# Patient Record
Sex: FEMALE | Race: WHITE | Employment: OTHER | URBAN - METROPOLITAN AREA
[De-identification: names, ages, dates, MRNs, and addresses within clinical notes are randomized per-mention and may not be internally consistent; named-entity substitution may affect disease eponyms.]

---

## 2022-08-01 ENCOUNTER — APPOINTMENT (OUTPATIENT)
Dept: CT IMAGING | Age: 88
End: 2022-08-01
Payer: MEDICARE

## 2022-08-01 ENCOUNTER — HOSPITAL ENCOUNTER (OUTPATIENT)
Age: 88
Setting detail: OBSERVATION
Discharge: HOME | End: 2022-08-03
Attending: STUDENT IN AN ORGANIZED HEALTH CARE EDUCATION/TRAINING PROGRAM | Admitting: INTERNAL MEDICINE
Payer: MEDICARE

## 2022-08-01 ENCOUNTER — APPOINTMENT (OUTPATIENT)
Dept: GENERAL RADIOLOGY | Age: 88
End: 2022-08-01
Payer: MEDICARE

## 2022-08-01 DIAGNOSIS — R53.1 GENERALIZED WEAKNESS: ICD-10-CM

## 2022-08-01 DIAGNOSIS — N17.9 AKI (ACUTE KIDNEY INJURY) (HCC): Primary | ICD-10-CM

## 2022-08-01 PROBLEM — N39.0 UTI (URINARY TRACT INFECTION) WITH PYURIA: Status: ACTIVE | Noted: 2022-08-01

## 2022-08-01 LAB
ALBUMIN SERPL-MCNC: 3.8 G/DL (ref 3.5–5.2)
ALBUMIN SERPL-MCNC: 4 G/DL (ref 3.5–5.2)
ALP BLD-CCNC: 80 U/L (ref 35–104)
ALP BLD-CCNC: 84 U/L (ref 35–104)
ALT SERPL-CCNC: <5 U/L (ref 0–32)
ALT SERPL-CCNC: <5 U/L (ref 0–32)
ANION GAP SERPL CALCULATED.3IONS-SCNC: 13 MMOL/L (ref 7–16)
ANION GAP SERPL CALCULATED.3IONS-SCNC: 14 MMOL/L (ref 7–16)
AST SERPL-CCNC: 15 U/L (ref 0–31)
AST SERPL-CCNC: 15 U/L (ref 0–31)
BACTERIA: ABNORMAL /HPF
BILIRUB SERPL-MCNC: 0.8 MG/DL (ref 0–1.2)
BILIRUB SERPL-MCNC: 0.8 MG/DL (ref 0–1.2)
BILIRUBIN URINE: NEGATIVE
BLOOD, URINE: NEGATIVE
BUN BLDV-MCNC: 20 MG/DL (ref 6–23)
BUN BLDV-MCNC: 21 MG/DL (ref 6–23)
CALCIUM SERPL-MCNC: 9.1 MG/DL (ref 8.6–10.2)
CALCIUM SERPL-MCNC: 9.4 MG/DL (ref 8.6–10.2)
CHLORIDE BLD-SCNC: 107 MMOL/L (ref 98–107)
CHLORIDE BLD-SCNC: 107 MMOL/L (ref 98–107)
CLARITY: CLEAR
CO2: 20 MMOL/L (ref 22–29)
CO2: 20 MMOL/L (ref 22–29)
COLOR: YELLOW
CREAT SERPL-MCNC: 1.1 MG/DL (ref 0.5–1)
CREAT SERPL-MCNC: 1.2 MG/DL (ref 0.5–1)
GFR AFRICAN AMERICAN: 50
GFR AFRICAN AMERICAN: 56
GFR NON-AFRICAN AMERICAN: 42 ML/MIN/1.73
GFR NON-AFRICAN AMERICAN: 46 ML/MIN/1.73
GLUCOSE BLD-MCNC: 91 MG/DL (ref 74–99)
GLUCOSE BLD-MCNC: 94 MG/DL (ref 74–99)
GLUCOSE URINE: NEGATIVE MG/DL
HCT VFR BLD CALC: 43.4 % (ref 34–48)
HEMOGLOBIN: 14.2 G/DL (ref 11.5–15.5)
KETONES, URINE: NEGATIVE MG/DL
LACTIC ACID: 1.1 MMOL/L (ref 0.5–2.2)
LEUKOCYTE ESTERASE, URINE: ABNORMAL
MCH RBC QN AUTO: 29.2 PG (ref 26–35)
MCHC RBC AUTO-ENTMCNC: 32.7 % (ref 32–34.5)
MCV RBC AUTO: 89.1 FL (ref 80–99.9)
NITRITE, URINE: NEGATIVE
PDW BLD-RTO: 13.3 FL (ref 11.5–15)
PH UA: 6 (ref 5–9)
PLATELET # BLD: 288 E9/L (ref 130–450)
PMV BLD AUTO: 9.3 FL (ref 7–12)
POTASSIUM REFLEX MAGNESIUM: 4.1 MMOL/L (ref 3.5–5)
POTASSIUM REFLEX MAGNESIUM: 4.3 MMOL/L (ref 3.5–5)
PRO-BNP: 943 PG/ML (ref 0–450)
PROTEIN UA: NEGATIVE MG/DL
RBC # BLD: 4.87 E12/L (ref 3.5–5.5)
RBC UA: ABNORMAL /HPF (ref 0–2)
SODIUM BLD-SCNC: 140 MMOL/L (ref 132–146)
SODIUM BLD-SCNC: 141 MMOL/L (ref 132–146)
SPECIFIC GRAVITY UA: <=1.005 (ref 1–1.03)
TOTAL PROTEIN: 6.5 G/DL (ref 6.4–8.3)
TOTAL PROTEIN: 6.8 G/DL (ref 6.4–8.3)
TROPONIN, HIGH SENSITIVITY: 14 NG/L (ref 0–9)
TROPONIN, HIGH SENSITIVITY: 15 NG/L (ref 0–9)
UROBILINOGEN, URINE: 0.2 E.U./DL
WBC # BLD: 9.1 E9/L (ref 4.5–11.5)
WBC UA: ABNORMAL /HPF (ref 0–5)

## 2022-08-01 PROCEDURE — 83605 ASSAY OF LACTIC ACID: CPT

## 2022-08-01 PROCEDURE — 2580000003 HC RX 258: Performed by: PHYSICIAN ASSISTANT

## 2022-08-01 PROCEDURE — 87449 NOS EACH ORGANISM AG IA: CPT

## 2022-08-01 PROCEDURE — 6360000004 HC RX CONTRAST MEDICATION: Performed by: RADIOLOGY

## 2022-08-01 PROCEDURE — 0202U NFCT DS 22 TRGT SARS-COV-2: CPT

## 2022-08-01 PROCEDURE — G0378 HOSPITAL OBSERVATION PER HR: HCPCS

## 2022-08-01 PROCEDURE — 80053 COMPREHEN METABOLIC PANEL: CPT

## 2022-08-01 PROCEDURE — 36415 COLL VENOUS BLD VENIPUNCTURE: CPT

## 2022-08-01 PROCEDURE — 81001 URINALYSIS AUTO W/SCOPE: CPT

## 2022-08-01 PROCEDURE — 87088 URINE BACTERIA CULTURE: CPT

## 2022-08-01 PROCEDURE — 93005 ELECTROCARDIOGRAM TRACING: CPT | Performed by: PHYSICIAN ASSISTANT

## 2022-08-01 PROCEDURE — 84484 ASSAY OF TROPONIN QUANT: CPT

## 2022-08-01 PROCEDURE — 2580000003 HC RX 258: Performed by: INTERNAL MEDICINE

## 2022-08-01 PROCEDURE — 96361 HYDRATE IV INFUSION ADD-ON: CPT

## 2022-08-01 PROCEDURE — 71046 X-RAY EXAM CHEST 2 VIEWS: CPT

## 2022-08-01 PROCEDURE — 87040 BLOOD CULTURE FOR BACTERIA: CPT

## 2022-08-01 PROCEDURE — 83880 ASSAY OF NATRIURETIC PEPTIDE: CPT

## 2022-08-01 PROCEDURE — 71275 CT ANGIOGRAPHY CHEST: CPT

## 2022-08-01 PROCEDURE — 96374 THER/PROPH/DIAG INJ IV PUSH: CPT

## 2022-08-01 PROCEDURE — 2580000003 HC RX 258: Performed by: RADIOLOGY

## 2022-08-01 PROCEDURE — 96372 THER/PROPH/DIAG INJ SC/IM: CPT

## 2022-08-01 PROCEDURE — 85027 COMPLETE CBC AUTOMATED: CPT

## 2022-08-01 PROCEDURE — 6360000002 HC RX W HCPCS: Performed by: INTERNAL MEDICINE

## 2022-08-01 PROCEDURE — 99285 EMERGENCY DEPT VISIT HI MDM: CPT

## 2022-08-01 RX ORDER — MV-MN/OM3/DHA/EPA/FISH/LUT/ZEA 250-5-1 MG
1 CAPSULE ORAL DAILY
COMMUNITY

## 2022-08-01 RX ORDER — LEVOTHYROXINE SODIUM 0.05 MG/1
50 TABLET ORAL DAILY
COMMUNITY

## 2022-08-01 RX ORDER — PANTOPRAZOLE SODIUM 40 MG/1
40 TABLET, DELAYED RELEASE ORAL
Status: DISCONTINUED | OUTPATIENT
Start: 2022-08-02 | End: 2022-08-03 | Stop reason: HOSPADM

## 2022-08-01 RX ORDER — FUROSEMIDE 20 MG/1
20 TABLET ORAL
COMMUNITY

## 2022-08-01 RX ORDER — POTASSIUM CHLORIDE 750 MG/1
10 CAPSULE, EXTENDED RELEASE ORAL
COMMUNITY

## 2022-08-01 RX ORDER — SODIUM CHLORIDE 0.9 % (FLUSH) 0.9 %
10 SYRINGE (ML) INJECTION PRN
Status: COMPLETED | OUTPATIENT
Start: 2022-08-01 | End: 2022-08-01

## 2022-08-01 RX ORDER — AMIODARONE HYDROCHLORIDE 100 MG/1
100 TABLET ORAL DAILY
COMMUNITY

## 2022-08-01 RX ORDER — CHOLECALCIFEROL (VITAMIN D3) 125 MCG
500 CAPSULE ORAL DAILY
COMMUNITY

## 2022-08-01 RX ORDER — SODIUM CHLORIDE 9 MG/ML
INJECTION, SOLUTION INTRAVENOUS CONTINUOUS
Status: DISCONTINUED | OUTPATIENT
Start: 2022-08-01 | End: 2022-08-02

## 2022-08-01 RX ORDER — 0.9 % SODIUM CHLORIDE 0.9 %
500 INTRAVENOUS SOLUTION INTRAVENOUS ONCE
Status: COMPLETED | OUTPATIENT
Start: 2022-08-01 | End: 2022-08-01

## 2022-08-01 RX ORDER — ACETAMINOPHEN 325 MG/1
650 TABLET ORAL EVERY 4 HOURS PRN
Status: DISCONTINUED | OUTPATIENT
Start: 2022-08-01 | End: 2022-08-03 | Stop reason: HOSPADM

## 2022-08-01 RX ORDER — HEPARIN SODIUM 10000 [USP'U]/ML
5000 INJECTION, SOLUTION INTRAVENOUS; SUBCUTANEOUS EVERY 8 HOURS
Status: DISCONTINUED | OUTPATIENT
Start: 2022-08-01 | End: 2022-08-02

## 2022-08-01 RX ADMIN — SODIUM CHLORIDE, PRESERVATIVE FREE 10 ML: 5 INJECTION INTRAVENOUS at 18:35

## 2022-08-01 RX ADMIN — HEPARIN SODIUM 5000 UNITS: 10000 INJECTION INTRAVENOUS; SUBCUTANEOUS at 21:04

## 2022-08-01 RX ADMIN — SODIUM CHLORIDE: 9 INJECTION, SOLUTION INTRAVENOUS at 21:03

## 2022-08-01 RX ADMIN — SODIUM CHLORIDE 500 ML: 9 INJECTION, SOLUTION INTRAVENOUS at 15:35

## 2022-08-01 RX ADMIN — IOPAMIDOL 60 ML: 755 INJECTION, SOLUTION INTRAVENOUS at 18:38

## 2022-08-01 RX ADMIN — WATER 1000 MG: 1 INJECTION INTRAMUSCULAR; INTRAVENOUS; SUBCUTANEOUS at 21:03

## 2022-08-01 ASSESSMENT — ENCOUNTER SYMPTOMS
CHOKING: 0
SHORTNESS OF BREATH: 1
VOMITING: 0
COUGH: 1
PHOTOPHOBIA: 0
EYE PAIN: 0
ABDOMINAL PAIN: 0
NAUSEA: 1

## 2022-08-01 ASSESSMENT — PAIN SCALES - GENERAL
PAINLEVEL_OUTOF10: 0
PAINLEVEL_OUTOF10: 0

## 2022-08-01 ASSESSMENT — LIFESTYLE VARIABLES: HOW OFTEN DO YOU HAVE A DRINK CONTAINING ALCOHOL: NEVER

## 2022-08-01 NOTE — ED PROVIDER NOTES
nausea. Negative for abdominal pain and vomiting. Genitourinary:  Negative for dysuria, enuresis and hematuria. Musculoskeletal:  Negative for arthralgias and myalgias. Skin:  Negative for rash and wound. Neurological:  Positive for weakness and light-headedness. Physical Exam  Constitutional:       Appearance: She is normal weight. Eyes:      Extraocular Movements: Extraocular movements intact. Pupils: Pupils are equal, round, and reactive to light. Cardiovascular:      Rate and Rhythm: Normal rate and regular rhythm. Pulmonary:      Effort: Pulmonary effort is normal.      Breath sounds: Normal breath sounds. No decreased breath sounds, wheezing, rhonchi or rales. Chest:      Chest wall: No tenderness or crepitus. Abdominal:      Palpations: Abdomen is soft. Tenderness: There is no abdominal tenderness. Musculoskeletal:         General: Normal range of motion. Right lower leg: No edema. Left lower leg: No edema. Skin:     General: Skin is warm. Capillary Refill: Capillary refill takes less than 2 seconds. Coloration: Skin is not cyanotic. Neurological:      General: No focal deficit present. Mental Status: She is alert and oriented to person, place, and time. Procedures       MDM  Number of Diagnoses or Management Options  MAZIN (acute kidney injury) ([facility] [address]  Generalized weakness  Diagnosis management comments: Patient presents with her daughter to the ED with complaints of symptoms as mentioned in HPI. Patient is calm, alert, oriented. Management plan is agreed upon with patient and CTA pulmonary with IV contrast is ordered. Other labs are also ordered. ED Course as of 08/02/22 0759   Mon Aug 01, 2022   1528 Patient pulse ox is 95%. [AH]   1726 BNP returned over 900. Troponin elevated at 15 therefore repeat troponin ordered. [AH]   80 Consulted with Dr. Amrita Jeter, hospitalist, who accepted for admission.   [KG]   1835 Repeat troponin resulted less than initial troponin at 14. [AH]      ED Course User Index  [AH] Domo Bradshaw MD  [KG] Kathy Wylie DO     ED Course as of 08/16/22 2151   [facility] Aug 01, 2022   1528 Patient pulse ox is 95%. [AH]   1726 BNP returned over 900. Troponin elevated at 15 therefore repeat troponin ordered. [AH]   80 Consulted with Dr. Jesus Terrazas, hospitalist, who accepted for admission. [KG]   1835 Repeat troponin resulted less than initial troponin at 14. [AH]      ED Course User Index  [AH] Domo Bradshaw MD  [KG] Kathy Wylie DO       --------------------------------------------- PAST HISTORY ---------------------------------------------  Past Medical History:  has a past medical history of Aortic stenosis, CKD (chronic kidney disease) stage 3, GFR 30-59 ml/min ([address] COVID-19, Pulmonary hypertension ([address] and UTI (urinary tract infection) with pyuria. Past Surgical History:  has no past surgical history on file. Social History:  reports that she has never smoked. She has never been exposed to tobacco smoke. She has never used smokeless tobacco. She reports that she does not drink alcohol and does not use drugs. Family History: family history is not on file. The patients home medications have been reviewed. Allergies: Patient has no known allergies.     -------------------------------------------------- RESULTS -------------------------------------------------    LABS:  Results for orders placed or performed during the hospital encounter of 08/01/22   Culture, Urine    Specimen: Urine, clean catch   Result Value Ref Range    Urine Culture, Routine <10,000 CFU/mL  Mixed gram positive organisms      Culture, Blood 1    Specimen: Blood   Result Value Ref Range    Blood Culture, Routine 5 Days no growth    Culture, Blood 2    Specimen: Blood   Result Value Ref Range    Culture, Blood 2 5 Days no growth    Respiratory Panel, Molecular, with COVID-19 (Restricted: peds pts or suitable admitted adults)    Specimen: Nasopharyngeal   Result Value Ref Range    Adenovirus by PCR Not Detected Not Detected    Bordetella parapertussis by PCR Not Detected Not Detected    Bordetella pertussis by PCR Not Detected Not Detected    Chlamydophilia pneumoniae by PCR Not Detected Not Detected    Coronavirus 229E by PCR Not Detected Not Detected    Coronavirus HKU1 by PCR Not Detected Not Detected    Coronavirus NL63 by PCR Not Detected Not Detected    Coronavirus OC43 by PCR Not Detected Not Detected    SARS-CoV-2, PCR DETECTED (A) Not Detected    Human Metapneumovirus by PCR Not Detected Not Detected    Human Rhinovirus/Enterovirus by PCR Not Detected Not Detected    Influenza A by PCR Not Detected Not Detected    Influenza B by PCR Not Detected Not Detected    Mycoplasma pneumoniae by PCR Not Detected Not Detected    Parainfluenza Virus 1 by PCR Not Detected Not Detected    Parainfluenza Virus 2 by PCR Not Detected Not Detected    Parainfluenza Virus 3 by PCR Not Detected Not Detected    Parainfluenza Virus 4 by PCR Not Detected Not Detected    Respiratory Syncytial Virus by PCR Not Detected Not Detected   Strep Pneumoniae Antigen    Specimen: Urine, clean catch   Result Value Ref Range    STREP PNEUMONIAE ANTIGEN, URINE       Presumptive negative- suggests no current or recent  pneumococcal infection. Infection due to Strep pneumoniae cannot be  ruled out since the antigen present in the sample  may be below the detection limit of the test.  Normal Range:Presumptive Negative     Legionella antigen, urine    Specimen: Urine   Result Value Ref Range    L. pneumophila Serogp 1 Ur Ag       Presumptive Negative -suggesting no recent or current infections  with Legionella pneumophila serogroup 1. Infection to Legionella cannot be ruled out since other serogroups  and species may cause infection, antigen may not be present in  early infection, or level of antigen may be below the  detection limit.   Normal Range: Presumptive Negative     CBC   Result Value Ref Range    WBC 9.1 4.5 - 11.5 E9/L    RBC 4.87 3.50 - 5.50 E12/L    Hemoglobin 14.2 11.5 - 15.5 g/dL    Hematocrit 43.4 34.0 - 48.0 %    MCV 89.1 80.0 - 99.9 fL    MCH 29.2 26.0 - 35.0 pg    MCHC 32.7 32.0 - 34.5 %    RDW 13.3 11.5 - 15.0 fL    Platelets 583 463 - 147 E9/L    MPV 9.3 7.0 - 12.0 fL   Troponin   Result Value Ref Range    Troponin, High Sensitivity 15 (H) 0 - 9 ng/L   Urinalysis   Result Value Ref Range    Color, UA Yellow Straw/Yellow    Clarity, UA Clear Clear    Glucose, Ur Negative Negative mg/dL    Bilirubin Urine Negative Negative    Ketones, Urine Negative Negative mg/dL    Specific Gravity, UA <=1.005 1.005 - 1.030    Blood, Urine Negative Negative    pH, UA 6.0 5.0 - 9.0    Protein, UA Negative Negative mg/dL    Urobilinogen, Urine 0.2 <2.0 E.U./dL    Nitrite, Urine Negative Negative    Leukocyte Esterase, Urine MODERATE (A) Negative   Brain Natriuretic Peptide   Result Value Ref Range    Pro- (H) 0 - 450 pg/mL   Lactic Acid   Result Value Ref Range    Lactic Acid 1.1 0.5 - 2.2 mmol/L   Microscopic Urinalysis   Result Value Ref Range    WBC, UA 2-5 0 - 5 /HPF    RBC, UA NONE 0 - 2 /HPF    Bacteria, UA RARE (A) None Seen /HPF   Troponin   Result Value Ref Range    Troponin, High Sensitivity 14 (H) 0 - 9 ng/L   Comprehensive Metabolic Panel w/ Reflex to MG   Result Value Ref Range    Sodium 140 132 - 146 mmol/L    Potassium reflex Magnesium 4.3 3.5 - 5.0 mmol/L    Chloride 107 98 - 107 mmol/L    CO2 20 (L) 22 - 29 mmol/L    Anion Gap 13 7 - 16 mmol/L    Glucose 91 74 - 99 mg/dL    BUN 20 6 - 23 mg/dL    Creatinine 1.1 (H) 0.5 - 1.0 mg/dL    GFR Non-African American 46 >=60 mL/min/1.73    GFR African American 56     Calcium 9.1 8.6 - 10.2 mg/dL    Total Protein 6.5 6.4 - 8.3 g/dL    Albumin 3.8 3.5 - 5.2 g/dL    Total Bilirubin 0.8 0.0 - 1.2 mg/dL    Alkaline Phosphatase 80 35 - 104 U/L    ALT <5 0 - 32 U/L    AST 15 0 - 31 U/L   Comprehensive Metabolic Panel w/ Reflex to MG   Result Value Ref Range    Sodium 141 132 - 146 mmol/L    Potassium reflex Magnesium 4.1 3.5 - 5.0 mmol/L    Chloride 107 98 - 107 mmol/L    CO2 20 (L) 22 - 29 mmol/L    Anion Gap 14 7 - 16 mmol/L    Glucose 94 74 - 99 mg/dL    BUN 21 6 - 23 mg/dL    Creatinine 1.2 (H) 0.5 - 1.0 mg/dL    GFR Non-African American 42 >=60 mL/min/1.73    GFR African American 50     Calcium 9.4 8.6 - 10.2 mg/dL    Total Protein 6.8 6.4 - 8.3 g/dL    Albumin 4.0 3.5 - 5.2 g/dL    Total Bilirubin 0.8 0.0 - 1.2 mg/dL    Alkaline Phosphatase 84 35 - 104 U/L    ALT <5 0 - 32 U/L    AST 15 0 - 31 U/L   Basic Metabolic Panel   Result Value Ref Range    Sodium 141 132 - 146 mmol/L    Potassium 3.9 3.5 - 5.0 mmol/L    Chloride 109 (H) 98 - 107 mmol/L    CO2 21 (L) 22 - 29 mmol/L    Anion Gap 11 7 - 16 mmol/L    Glucose 84 74 - 99 mg/dL    BUN 16 6 - 23 mg/dL    Creatinine 1.1 (H) 0.5 - 1.0 mg/dL    GFR Non-African American 46 >=60 mL/min/1.73    GFR African American 56     Calcium 8.8 8.6 - 10.2 mg/dL   Brain Natriuretic Peptide   Result Value Ref Range    Pro-BNP 1,363 (H) 0 - 450 pg/mL   Calcium, Ionized   Result Value Ref Range    Calcium, Ionized 1.26 1.15 - 1.33 mmol/L   Antistreptolysin O Titer   Result Value Ref Range    ASO <20 0 - 200 IU/mL   CBC with Auto Differential   Result Value Ref Range    WBC 7.9 4.5 - 11.5 E9/L    RBC 4.32 3.50 - 5.50 E12/L    Hemoglobin 12.8 11.5 - 15.5 g/dL    Hematocrit 38.5 34.0 - 48.0 %    MCV 89.1 80.0 - 99.9 fL    MCH 29.6 26.0 - 35.0 pg    MCHC 33.2 32.0 - 34.5 %    RDW 13.2 11.5 - 15.0 fL    Platelets 421 301 - 067 E9/L    MPV 9.1 7.0 - 12.0 fL    Neutrophils % 51.1 43.0 - 80.0 %    Immature Granulocytes % 0.5 0.0 - 5.0 %    Lymphocytes % 32.6 20.0 - 42.0 %    Monocytes % 10.6 2.0 - 12.0 %    Eosinophils % 4.1 0.0 - 6.0 %    Basophils % 1.1 0.0 - 2.0 %    Neutrophils Absolute 4.03 1.80 - 7.30 E9/L    Immature Granulocytes # 0.04 E9/L    Lymphocytes Absolute 2.57 1.50 - 4.00 E9/L    Monocytes Absolute 0.84 0.10 - 0.95 E9/L    Eosinophils Absolute 0.32 0.05 - 0.50 E9/L    Basophils Absolute 0.09 0.00 - 0.20 E9/L   C-Reactive Protein   Result Value Ref Range    CRP 0.6 (H) 0.0 - 0.4 mg/dL   Magnesium   Result Value Ref Range    Magnesium 2.2 1.6 - 2.6 mg/dL   Lipid Panel   Result Value Ref Range    Cholesterol, Total 177 0 - 199 mg/dL    Triglycerides 67 0 - 149 mg/dL    HDL 53 >40 mg/dL    LDL Calculated 111 (H) 0 - 99 mg/dL    VLDL Cholesterol Calculated 13 mg/dL   Lactate, Sepsis   Result Value Ref Range    Lactic Acid, Sepsis 0.8 0.5 - 1.9 mmol/L   Hepatic Function Panel   Result Value Ref Range    Total Protein 5.7 (L) 6.4 - 8.3 g/dL    Albumin 3.3 (L) 3.5 - 5.2 g/dL    Alkaline Phosphatase 69 35 - 104 U/L    ALT <5 0 - 32 U/L    AST 15 0 - 31 U/L    Total Bilirubin 0.6 0.0 - 1.2 mg/dL    Bilirubin, Direct <0.2 0.0 - 0.3 mg/dL    Bilirubin, Indirect see below 0.0 - 1.0 mg/dL   Hemoglobin A1C   Result Value Ref Range    Hemoglobin A1C 5.6 4.0 - 5.6 %   Ferritin   Result Value Ref Range    Ferritin 152 ng/mL   Iron and TIBC   Result Value Ref Range    Iron 63 37 - 145 mcg/dL    TIBC 197 (L) 250 - 450 mcg/dL    Iron Saturation 32 15 - 50 %   Vitamin B12 & Folate   Result Value Ref Range    Vitamin B-12 >2000 (H) 211 - 946 pg/mL    Folate >20.0 4.8 - 24.2 ng/mL   Phosphorus   Result Value Ref Range    Phosphorus 3.1 2.5 - 4.5 mg/dL   Prealbumin   Result Value Ref Range    Prealbumin 20 20 - 40 mg/dL   Procalcitonin   Result Value Ref Range    Procalcitonin 0.05 0.00 - 0.08 ng/mL   TSH   Result Value Ref Range    TSH 1.350 0.270 - 4.200 uIU/mL   Troponin   Result Value Ref Range    Troponin, High Sensitivity 16 (H) 0 - 9 ng/L   Sedimentation Rate   Result Value Ref Range    Sed Rate 12 0 - 20 mm/Hr   Uric Acid   Result Value Ref Range    Uric Acid, Serum 4.5 2.4 - 5.7 mg/dL   D-Dimer, Quantitative   Result Value Ref Range    D-Dimer, Quant <200 ng/mL DDU   Troponin   Result Value Ref Range    Troponin, High Sensitivity 17 (H) 0 - 9 ng/L   CK   Result Value Ref Range    Total CK 40 20 - 180 U/L   Basic Metabolic Panel   Result Value Ref Range    Sodium 143 132 - 146 mmol/L    Potassium 4.0 3.5 - 5.0 mmol/L    Chloride 111 (H) 98 - 107 mmol/L    CO2 20 (L) 22 - 29 mmol/L    Anion Gap 12 7 - 16 mmol/L    Glucose 98 74 - 99 mg/dL    BUN 21 6 - 23 mg/dL    Creatinine 1.2 (H) 0.5 - 1.0 mg/dL    GFR Non-African American 42 >=60 mL/min/1.73    GFR African American 50     Calcium 8.8 8.6 - 10.2 mg/dL   CBC with Auto Differential   Result Value Ref Range    WBC 7.4 4.5 - 11.5 E9/L    RBC 4.41 3.50 - 5.50 E12/L    Hemoglobin 13.0 11.5 - 15.5 g/dL    Hematocrit 39.5 34.0 - 48.0 %    MCV 89.6 80.0 - 99.9 fL    MCH 29.5 26.0 - 35.0 pg    MCHC 32.9 32.0 - 34.5 %    RDW 13.3 11.5 - 15.0 fL    Platelets 875 839 - 490 E9/L    MPV 9.3 7.0 - 12.0 fL    Neutrophils % 52.1 43.0 - 80.0 %    Immature Granulocytes % 0.3 0.0 - 5.0 %    Lymphocytes % 31.6 20.0 - 42.0 %    Monocytes % 10.3 2.0 - 12.0 %    Eosinophils % 4.6 0.0 - 6.0 %    Basophils % 1.1 0.0 - 2.0 %    Neutrophils Absolute 3.85 1.80 - 7.30 E9/L    Immature Granulocytes # 0.02 E9/L    Lymphocytes Absolute 2.33 1.50 - 4.00 E9/L    Monocytes Absolute 0.76 0.10 - 0.95 E9/L    Eosinophils Absolute 0.34 0.05 - 0.50 E9/L    Basophils Absolute 0.08 0.00 - 0.20 E9/L   Magnesium   Result Value Ref Range    Magnesium 2.1 1.6 - 2.6 mg/dL   Lactate, Sepsis   Result Value Ref Range    Lactic Acid, Sepsis 1.6 0.5 - 1.9 mmol/L   Hepatic Function Panel   Result Value Ref Range    Total Protein 5.7 (L) 6.4 - 8.3 g/dL    Albumin 3.3 (L) 3.5 - 5.2 g/dL    Alkaline Phosphatase 72 35 - 104 U/L    ALT 5 0 - 32 U/L    AST 14 0 - 31 U/L    Total Bilirubin 0.6 0.0 - 1.2 mg/dL    Bilirubin, Direct <0.2 0.0 - 0.3 mg/dL    Bilirubin, Indirect see below 0.0 - 1.0 mg/dL   Phosphorus   Result Value Ref Range    Phosphorus 3.4 2.5 - 4.5 mg/dL   Sedimentation Rate   Result Value Ref Range Sed Rate 5 0 - 20 mm/Hr   Vitamin D 25 Hydroxy   Result Value Ref Range    Vit D, 25-Hydroxy 47 30 - 100 ng/mL   EKG 12 Lead   Result Value Ref Range    Ventricular Rate 59 BPM    Atrial Rate 59 BPM    P-R Interval 338 ms    QRS Duration 88 ms    Q-T Interval 476 ms    QTc Calculation (Bazett) 471 ms    P Axis 80 degrees    R Axis -25 degrees    T Axis 17 degrees       RADIOLOGY:  CTA PULMONARY W CONTRAST   Final Result   No evidence of pulmonary embolism or acute pulmonary abnormality. Cardiomegaly with dilated main pulmonary artery suggestive of pulmonary   arterial hypertension. XR CHEST (2 VW)   Final Result   No radiographic evidence of acute cardiopulmonary disease. EKG:  This EKG is signed and interpreted by me. Rate: 59  Rhythm: Sinus  Interpretation: Sinus bradycardia with 1st degree AV block, nonspecific st changes throughout, qtc is 471  Comparison: stable as compared to patient's most recent EKG      ------------------------- NURSING NOTES AND VITALS REVIEWED ---------------------------  Date / Time Roomed:  8/1/2022  2:31 PM  ED Bed Assignment:  [id number]    The nursing notes within the ED encounter and vital signs as below have been reviewed. No data found. Oxygen Saturation Interpretation: Normal    ------------------------------------------ PROGRESS NOTES ------------------------------------------  Re-evaluation(s):  Patients symptoms show no change  Repeat physical examination is not changed    Counseling:  I have spoken with the patient and discussed todays results, in addition to providing specific details for the plan of care and counseling regarding the diagnosis and prognosis. Their questions are answered at this time and they are agreeable with the plan of admission.    --------------------------------- ADDITIONAL PROVIDER NOTES ---------------------------------  Consultations:  Spoke with Dr. Roxana Baron Discussed case.   They will admit the patient. This patient's ED course included: a personal history and physicial examination, re-evaluation prior to disposition, multiple bedside re-evaluations, IV medications, cardiac monitoring, continuous pulse oximetry, and complex medical decision making and emergency management    This patient has remained hemodynamically stable during their ED course. Diagnosis:  1. MAZIN (acute kidney injury) ([facility] [address]    2. Generalized weakness        Disposition:  Patient's disposition: Admit to med/surg floor  Patient's condition is stable.          Aurelio Prabhakar DO  08/16/22 2156

## 2022-08-01 NOTE — PROGRESS NOTES
Database initiated pharmacy and medications verified with the patient. She is A&O independent from home alone. She is hard of hearing even with her hearing aids.  She ambulates without assistive devices and is RA at baseline

## 2022-08-01 NOTE — ED NOTES
Department of Emergency Medicine  FIRST PROVIDER TRIAGE NOTE             Independent MLP           8/1/22  12:25 PM EDT    Date of Encounter: 8/1/22   MRN: [id number]      HPI: Marlon Robert is a 81 y.o. female who presents to the ED for Shortness of Breath and Fatigue (covid positive x a few weeks ago, weakness, covid antibodies 7/21 ( sent by Dr Blayne Coello))  Antibody infusion. Primary urologist concern for dehydration. Patient sent in for further evaluation. ROS: Negative for cp, back pain, fever, or cough. PE: Gen Appearance/Constitutional: alert  HEENT: NC/NT. PERRLA,  Airway patent. Neck: supple         Initial Plan of Care: All treatment areas with department are currently occupied. Plan to order/Initiate the following while awaiting opening in ED: labs, EKG, and imaging studies.   Initiate Treatment-Testing, Proceed toTreatment Area When Bed Available for ED Attending/MLP to Continue Care    Electronically signed by DEMETRIUS Squires   DD: 8/1/22       DEMETRIUS Mireles  08/01/22 1080

## 2022-08-02 PROBLEM — U07.1 COVID-19: Status: ACTIVE | Noted: 2022-08-02

## 2022-08-02 PROBLEM — I35.0 AORTIC STENOSIS: Status: ACTIVE | Noted: 2022-08-02

## 2022-08-02 PROBLEM — N18.30 CKD (CHRONIC KIDNEY DISEASE) STAGE 3, GFR 30-59 ML/MIN (HCC): Status: ACTIVE | Noted: 2022-08-02

## 2022-08-02 PROBLEM — I27.20 PULMONARY HYPERTENSION (HCC): Status: ACTIVE | Noted: 2022-08-02

## 2022-08-02 LAB
ADENOVIRUS BY PCR: NOT DETECTED
ALBUMIN SERPL-MCNC: 3.3 G/DL (ref 3.5–5.2)
ALP BLD-CCNC: 69 U/L (ref 35–104)
ALT SERPL-CCNC: <5 U/L (ref 0–32)
ANION GAP SERPL CALCULATED.3IONS-SCNC: 11 MMOL/L (ref 7–16)
ANTISTREPTOLYSIN-O: <20 IU/ML (ref 0–200)
AST SERPL-CCNC: 15 U/L (ref 0–31)
BASOPHILS ABSOLUTE: 0.09 E9/L (ref 0–0.2)
BASOPHILS RELATIVE PERCENT: 1.1 % (ref 0–2)
BILIRUB SERPL-MCNC: 0.6 MG/DL (ref 0–1.2)
BILIRUBIN DIRECT: <0.2 MG/DL (ref 0–0.3)
BILIRUBIN, INDIRECT: ABNORMAL MG/DL (ref 0–1)
BORDETELLA PARAPERTUSSIS BY PCR: NOT DETECTED
BORDETELLA PERTUSSIS BY PCR: NOT DETECTED
BUN BLDV-MCNC: 16 MG/DL (ref 6–23)
C-REACTIVE PROTEIN: 0.6 MG/DL (ref 0–0.4)
CALCIUM IONIZED: 1.26 MMOL/L (ref 1.15–1.33)
CALCIUM SERPL-MCNC: 8.8 MG/DL (ref 8.6–10.2)
CHLAMYDOPHILIA PNEUMONIAE BY PCR: NOT DETECTED
CHLORIDE BLD-SCNC: 109 MMOL/L (ref 98–107)
CHOLESTEROL, TOTAL: 177 MG/DL (ref 0–199)
CO2: 21 MMOL/L (ref 22–29)
CORONAVIRUS 229E BY PCR: NOT DETECTED
CORONAVIRUS HKU1 BY PCR: NOT DETECTED
CORONAVIRUS NL63 BY PCR: NOT DETECTED
CORONAVIRUS OC43 BY PCR: NOT DETECTED
CREAT SERPL-MCNC: 1.1 MG/DL (ref 0.5–1)
D DIMER: <200 NG/ML DDU
EKG ATRIAL RATE: 59 BPM
EKG P AXIS: 80 DEGREES
EKG P-R INTERVAL: 338 MS
EKG Q-T INTERVAL: 476 MS
EKG QRS DURATION: 88 MS
EKG QTC CALCULATION (BAZETT): 471 MS
EKG R AXIS: -25 DEGREES
EKG T AXIS: 17 DEGREES
EKG VENTRICULAR RATE: 59 BPM
EOSINOPHILS ABSOLUTE: 0.32 E9/L (ref 0.05–0.5)
EOSINOPHILS RELATIVE PERCENT: 4.1 % (ref 0–6)
FERRITIN: 152 NG/ML
FOLATE: >20 NG/ML (ref 4.8–24.2)
GFR AFRICAN AMERICAN: 56
GFR NON-AFRICAN AMERICAN: 46 ML/MIN/1.73
GLUCOSE BLD-MCNC: 84 MG/DL (ref 74–99)
HBA1C MFR BLD: 5.6 % (ref 4–5.6)
HCT VFR BLD CALC: 38.5 % (ref 34–48)
HDLC SERPL-MCNC: 53 MG/DL
HEMOGLOBIN: 12.8 G/DL (ref 11.5–15.5)
HUMAN METAPNEUMOVIRUS BY PCR: NOT DETECTED
HUMAN RHINOVIRUS/ENTEROVIRUS BY PCR: NOT DETECTED
IMMATURE GRANULOCYTES #: 0.04 E9/L
IMMATURE GRANULOCYTES %: 0.5 % (ref 0–5)
INFLUENZA A BY PCR: NOT DETECTED
INFLUENZA B BY PCR: NOT DETECTED
IRON SATURATION: 32 % (ref 15–50)
IRON: 63 MCG/DL (ref 37–145)
L. PNEUMOPHILA SEROGP 1 UR AG: NORMAL
LACTIC ACID, SEPSIS: 0.8 MMOL/L (ref 0.5–1.9)
LDL CHOLESTEROL CALCULATED: 111 MG/DL (ref 0–99)
LV EF: 67 %
LVEF MODALITY: NORMAL
LYMPHOCYTES ABSOLUTE: 2.57 E9/L (ref 1.5–4)
LYMPHOCYTES RELATIVE PERCENT: 32.6 % (ref 20–42)
MAGNESIUM: 2.2 MG/DL (ref 1.6–2.6)
MCH RBC QN AUTO: 29.6 PG (ref 26–35)
MCHC RBC AUTO-ENTMCNC: 33.2 % (ref 32–34.5)
MCV RBC AUTO: 89.1 FL (ref 80–99.9)
MONOCYTES ABSOLUTE: 0.84 E9/L (ref 0.1–0.95)
MONOCYTES RELATIVE PERCENT: 10.6 % (ref 2–12)
MYCOPLASMA PNEUMONIAE BY PCR: NOT DETECTED
NEUTROPHILS ABSOLUTE: 4.03 E9/L (ref 1.8–7.3)
NEUTROPHILS RELATIVE PERCENT: 51.1 % (ref 43–80)
PARAINFLUENZA VIRUS 1 BY PCR: NOT DETECTED
PARAINFLUENZA VIRUS 2 BY PCR: NOT DETECTED
PARAINFLUENZA VIRUS 3 BY PCR: NOT DETECTED
PARAINFLUENZA VIRUS 4 BY PCR: NOT DETECTED
PDW BLD-RTO: 13.2 FL (ref 11.5–15)
PHOSPHORUS: 3.1 MG/DL (ref 2.5–4.5)
PLATELET # BLD: 258 E9/L (ref 130–450)
PMV BLD AUTO: 9.1 FL (ref 7–12)
POTASSIUM SERPL-SCNC: 3.9 MMOL/L (ref 3.5–5)
PREALBUMIN: 20 MG/DL (ref 20–40)
PRO-BNP: 1363 PG/ML (ref 0–450)
PROCALCITONIN: 0.05 NG/ML (ref 0–0.08)
RBC # BLD: 4.32 E12/L (ref 3.5–5.5)
RESPIRATORY SYNCYTIAL VIRUS BY PCR: NOT DETECTED
SARS-COV-2, PCR: DETECTED
SEDIMENTATION RATE, ERYTHROCYTE: 12 MM/HR (ref 0–20)
SODIUM BLD-SCNC: 141 MMOL/L (ref 132–146)
STREP PNEUMONIAE ANTIGEN, URINE: NORMAL
TOTAL CK: 40 U/L (ref 20–180)
TOTAL IRON BINDING CAPACITY: 197 MCG/DL (ref 250–450)
TOTAL PROTEIN: 5.7 G/DL (ref 6.4–8.3)
TRIGL SERPL-MCNC: 67 MG/DL (ref 0–149)
TROPONIN, HIGH SENSITIVITY: 16 NG/L (ref 0–9)
TROPONIN, HIGH SENSITIVITY: 17 NG/L (ref 0–9)
TSH SERPL DL<=0.05 MIU/L-ACNC: 1.35 UIU/ML (ref 0.27–4.2)
URIC ACID, SERUM: 4.5 MG/DL (ref 2.4–5.7)
VITAMIN B-12: >2000 PG/ML (ref 211–946)
VLDLC SERPL CALC-MCNC: 13 MG/DL
WBC # BLD: 7.9 E9/L (ref 4.5–11.5)

## 2022-08-02 PROCEDURE — 83550 IRON BINDING TEST: CPT

## 2022-08-02 PROCEDURE — 93306 TTE W/DOPPLER COMPLETE: CPT

## 2022-08-02 PROCEDURE — 36415 COLL VENOUS BLD VENIPUNCTURE: CPT

## 2022-08-02 PROCEDURE — 83735 ASSAY OF MAGNESIUM: CPT

## 2022-08-02 PROCEDURE — 84550 ASSAY OF BLOOD/URIC ACID: CPT

## 2022-08-02 PROCEDURE — 2580000003 HC RX 258: Performed by: INTERNAL MEDICINE

## 2022-08-02 PROCEDURE — 83880 ASSAY OF NATRIURETIC PEPTIDE: CPT

## 2022-08-02 PROCEDURE — 84100 ASSAY OF PHOSPHORUS: CPT

## 2022-08-02 PROCEDURE — 6360000002 HC RX W HCPCS: Performed by: INTERNAL MEDICINE

## 2022-08-02 PROCEDURE — 84484 ASSAY OF TROPONIN QUANT: CPT

## 2022-08-02 PROCEDURE — 6370000000 HC RX 637 (ALT 250 FOR IP): Performed by: INTERNAL MEDICINE

## 2022-08-02 PROCEDURE — 82746 ASSAY OF FOLIC ACID SERUM: CPT

## 2022-08-02 PROCEDURE — G0378 HOSPITAL OBSERVATION PER HR: HCPCS

## 2022-08-02 PROCEDURE — 82728 ASSAY OF FERRITIN: CPT

## 2022-08-02 PROCEDURE — 80076 HEPATIC FUNCTION PANEL: CPT

## 2022-08-02 PROCEDURE — 82550 ASSAY OF CK (CPK): CPT

## 2022-08-02 PROCEDURE — 84134 ASSAY OF PREALBUMIN: CPT

## 2022-08-02 PROCEDURE — 83605 ASSAY OF LACTIC ACID: CPT

## 2022-08-02 PROCEDURE — 97161 PT EVAL LOW COMPLEX 20 MIN: CPT

## 2022-08-02 PROCEDURE — 84145 PROCALCITONIN (PCT): CPT

## 2022-08-02 PROCEDURE — 86140 C-REACTIVE PROTEIN: CPT

## 2022-08-02 PROCEDURE — 82330 ASSAY OF CALCIUM: CPT

## 2022-08-02 PROCEDURE — 80061 LIPID PANEL: CPT

## 2022-08-02 PROCEDURE — 97165 OT EVAL LOW COMPLEX 30 MIN: CPT

## 2022-08-02 PROCEDURE — 85025 COMPLETE CBC W/AUTO DIFF WBC: CPT

## 2022-08-02 PROCEDURE — 84443 ASSAY THYROID STIM HORMONE: CPT

## 2022-08-02 PROCEDURE — 86060 ANTISTREPTOLYSIN O TITER: CPT

## 2022-08-02 PROCEDURE — 83540 ASSAY OF IRON: CPT

## 2022-08-02 PROCEDURE — 96372 THER/PROPH/DIAG INJ SC/IM: CPT

## 2022-08-02 PROCEDURE — 82607 VITAMIN B-12: CPT

## 2022-08-02 PROCEDURE — 80048 BASIC METABOLIC PNL TOTAL CA: CPT

## 2022-08-02 PROCEDURE — 85378 FIBRIN DEGRADE SEMIQUANT: CPT

## 2022-08-02 PROCEDURE — 85651 RBC SED RATE NONAUTOMATED: CPT

## 2022-08-02 PROCEDURE — 83036 HEMOGLOBIN GLYCOSYLATED A1C: CPT

## 2022-08-02 RX ORDER — LANOLIN ALCOHOL/MO/W.PET/CERES
500 CREAM (GRAM) TOPICAL DAILY
Status: DISCONTINUED | OUTPATIENT
Start: 2022-08-02 | End: 2022-08-02

## 2022-08-02 RX ORDER — ALBUTEROL SULFATE 90 UG/1
2 AEROSOL, METERED RESPIRATORY (INHALATION) 4 TIMES DAILY
Status: DISCONTINUED | OUTPATIENT
Start: 2022-08-02 | End: 2022-08-03 | Stop reason: HOSPADM

## 2022-08-02 RX ORDER — FUROSEMIDE 20 MG/1
20 TABLET ORAL
Status: DISCONTINUED | OUTPATIENT
Start: 2022-08-02 | End: 2022-08-03 | Stop reason: HOSPADM

## 2022-08-02 RX ORDER — MV-MN/OM3/DHA/EPA/FISH/LUT/ZEA 250-5-1 MG
1 CAPSULE ORAL DAILY
Status: DISCONTINUED | OUTPATIENT
Start: 2022-08-02 | End: 2022-08-02 | Stop reason: ALTCHOICE

## 2022-08-02 RX ORDER — LEVOTHYROXINE SODIUM 0.05 MG/1
50 TABLET ORAL DAILY
Status: DISCONTINUED | OUTPATIENT
Start: 2022-08-02 | End: 2022-08-03 | Stop reason: HOSPADM

## 2022-08-02 RX ORDER — POTASSIUM CHLORIDE 750 MG/1
10 TABLET, EXTENDED RELEASE ORAL
Status: DISCONTINUED | OUTPATIENT
Start: 2022-08-02 | End: 2022-08-03 | Stop reason: HOSPADM

## 2022-08-02 RX ORDER — AMIODARONE HYDROCHLORIDE 200 MG/1
100 TABLET ORAL DAILY
Status: DISCONTINUED | OUTPATIENT
Start: 2022-08-02 | End: 2022-08-03 | Stop reason: HOSPADM

## 2022-08-02 RX ORDER — VITAMIN B COMPLEX
1000 TABLET ORAL DAILY
Status: DISCONTINUED | OUTPATIENT
Start: 2022-08-02 | End: 2022-08-03 | Stop reason: HOSPADM

## 2022-08-02 RX ORDER — SODIUM CHLORIDE 0.9 % (FLUSH) 0.9 %
5-40 SYRINGE (ML) INJECTION PRN
Status: DISCONTINUED | OUTPATIENT
Start: 2022-08-02 | End: 2022-08-03 | Stop reason: HOSPADM

## 2022-08-02 RX ORDER — SODIUM CHLORIDE 0.9 % (FLUSH) 0.9 %
5-40 SYRINGE (ML) INJECTION 2 TIMES DAILY
Status: DISCONTINUED | OUTPATIENT
Start: 2022-08-02 | End: 2022-08-03 | Stop reason: HOSPADM

## 2022-08-02 RX ORDER — M-VIT,TX,IRON,MINS/CALC/FOLIC 27MG-0.4MG
1 TABLET ORAL DAILY
Status: DISCONTINUED | OUTPATIENT
Start: 2022-08-02 | End: 2022-08-03 | Stop reason: HOSPADM

## 2022-08-02 RX ADMIN — MULTIPLE VITAMINS W/ MINERALS TAB 1 TABLET: TAB at 08:58

## 2022-08-02 RX ADMIN — APIXABAN 2.5 MG: 2.5 TABLET, FILM COATED ORAL at 21:12

## 2022-08-02 RX ADMIN — AMIODARONE HYDROCHLORIDE 100 MG: 200 TABLET ORAL at 08:58

## 2022-08-02 RX ADMIN — APIXABAN 2.5 MG: 2.5 TABLET, FILM COATED ORAL at 08:58

## 2022-08-02 RX ADMIN — SODIUM CHLORIDE, PRESERVATIVE FREE 10 ML: 5 INJECTION INTRAVENOUS at 08:33

## 2022-08-02 RX ADMIN — HEPARIN SODIUM 5000 UNITS: 10000 INJECTION INTRAVENOUS; SUBCUTANEOUS at 05:56

## 2022-08-02 RX ADMIN — SODIUM CHLORIDE: 9 INJECTION, SOLUTION INTRAVENOUS at 04:37

## 2022-08-02 RX ADMIN — Medication 1000 UNITS: at 08:58

## 2022-08-02 RX ADMIN — LEVOTHYROXINE SODIUM 50 MCG: 0.05 TABLET ORAL at 08:58

## 2022-08-02 RX ADMIN — SODIUM CHLORIDE, PRESERVATIVE FREE 10 ML: 5 INJECTION INTRAVENOUS at 21:12

## 2022-08-02 RX ADMIN — ALBUTEROL SULFATE 2 PUFF: 90 AEROSOL, METERED RESPIRATORY (INHALATION) at 14:35

## 2022-08-02 RX ADMIN — ALBUTEROL SULFATE 2 PUFF: 90 AEROSOL, METERED RESPIRATORY (INHALATION) at 21:12

## 2022-08-02 RX ADMIN — PANTOPRAZOLE SODIUM 40 MG: 40 TABLET, DELAYED RELEASE ORAL at 05:56

## 2022-08-02 ASSESSMENT — PAIN SCALES - GENERAL
PAINLEVEL_OUTOF10: 0

## 2022-08-02 NOTE — PROGRESS NOTES
Spoke to Dr. Zaire Mills via telephone re: Ping clarifications. Per Dr. Zaire Mills- patient should stay in droplet plus isolation at this time. Bedside RN updating daughter at bedside.

## 2022-08-02 NOTE — PROGRESS NOTES
Called Dr. Morrison Pleasure office at [phone] requesting echo records. Spoke to Marlene Aaron. States she will fax them once receive disclosure of medical information. Their fax # is [phone].

## 2022-08-02 NOTE — PROGRESS NOTES
ECHO complete results from 1/13/22 & ECHO limited results from 5/31/22 placed in patients soft chart.     Electronically signed by Waqas Rivas RN on 8/2/2022 at 3:29 PM

## 2022-08-02 NOTE — PROGRESS NOTES
Message left on Dr. Juani Blanc answering service re: admission orders. Call received from Dr. Michoacano Paniagua- will place orders, droplet plus isolation discontinued.

## 2022-08-02 NOTE — CONSULTS
[address] [place] [address] [facility]    Consultation Note     Admit Date: 8/1/2022  2:31 PM    Reason for Consult:   COVID +ve,. After 2 weeks. Post ab infusion    Attending Physician:  Abdifatah Banegas DO     Chief Complaint: SOB    HISTORY OF PRESENT ILLNESS:   The patient is a 81 y.o.  female ot known to the Infectious Diseases service. The patient has hx of CKD, pulm HTN, heart murmur started having dry cough with nasal congestion on July 20 and was tested for COVID on the same day and she was positive. She got monoclonal antibody infusion in [place] on July 21. Her symptoms were little better. She started having shortness of breath and generalized weakness which never got better since COVID so she presented to the ER. Denies any chest pain has minimal productive cough with whitish sputum. No diarrhea abdominal pain or urinary symptoms. Since admission she is afebrile hemodynamically stable saturating well on room air labs showed normal CBC, normal LFTs, creatinine of 1.1/BUN of 20 CRP is only 0.6 procalcitonin is 0.05, ferritin is 152 sed rate is 12 D-dimer is less than 200. Blood cultures are so far negative UA looks clean RVP still showed COVID-positive. Patient is currently on ceftriaxone 1 g daily and I got consult further recommendations. Past Medical History:        Diagnosis Date    CKD (chronic kidney disease) stage 3, GFR 30-59 ml/min ([facility]) 8/2/2022    Pulmonary hypertension ([facility] [address] 8/2/2022    UTI (urinary tract infection) with pyuria 8/1/2022     Past Surgical History:    No past surgical history on file.   Current Medications:   Scheduled Meds:   amiodarone  100 mg Oral Daily    apixaban  2.5 mg Oral BID    Vitamin D  1,000 Units Oral Daily    furosemide  20 mg Oral Once per day on Mon Thu    levothyroxine  50 mcg Oral Daily    potassium chloride  10 mEq Oral Once per day on Mon Thu    sodium chloride flush  5-40 mL IntraVENous BID    therapeutic multivitamin-minerals  1 tablet Oral Daily    albuterol sulfate HFA  2 puff Inhalation 4x daily    pantoprazole  40 mg Oral QAM AC    cefTRIAXone (ROCEPHIN) IV  1,000 mg IntraVENous Q24H     Continuous Infusions:  PRN Meds:sodium chloride flush, acetaminophen    Allergies:  Patient has no known allergies. Social History:   Social History     Socioeconomic History    Marital status:      Spouse name: None    Number of children: None    Years of education: None    Highest education level: None   Tobacco Use    Smoking status: Never     Passive exposure: Never    Smokeless tobacco: Never   Vaping Use    Vaping Use: Never used   Substance and Sexual Activity    Alcohol use: Never    Drug use: Never       Family History:   No family history on file. . Otherwise non-pertinent to the chief complaint. REVIEW OF SYSTEMS:    As mentioned in HPI, all other systems negative. PHYSICAL EXAM:    Vitals:    /60   Pulse 52   Temp 97.8 °F (36.6 °C) (Oral)   Resp 18   Ht 5' 3\" (1.6 m)   Wt 172 lb 9.6 oz (78.3 kg)   SpO2 95%   BMI 30.57 kg/m²   Constitutional: The patient is awake, alert, and oriented. Skin: Warm and dry. No rashes were noted. No jaundice. HEENT: Eyes show round, and reactive pupils. Moist mucous membranes, no ulcerations, no thrush. Neck: Supple to movements. No lymphadenopathy. Chest: Clear to auscultation  Cardiovascular: S1 and S2 are rhythmic and regular. Systolic murmur ++  Abdomen: Soft nontender  Extremities: No clubbing, no cyanosis, no edema.   Musculoskeletal: No gross motor abnormalities  Neurological: Alert, oriented, hard of hearing moving all 4 extremities  Lines: peripheral      CBC+dif:  Recent Labs     08/01/22  1529 08/02/22  0430   WBC 9.1 7.9   HGB 14.2 12.8   HCT 43.4 38.5   MCV 89.1 89.1    258   NEUTROABS  --  4.03     Lab Results   Component Value Date    CRP 0.6 (H) 08/02/2022     No results found for: [facility]  Lab Results   Component Value Date SEDRATE 12 08/02/2022     Lab Results   Component Value Date    ALT <5 08/02/2022    AST 15 08/02/2022    ALKPHOS 69 08/02/2022    BILITOT 0.6 08/02/2022     Lab Results   Component Value Date/Time     08/02/2022 04:30 AM    K 3.9 08/02/2022 04:30 AM    K 4.3 08/01/2022 05:44 PM     08/02/2022 04:30 AM    CO2 21 08/02/2022 04:30 AM    BUN 16 08/02/2022 04:30 AM    CREATININE 1.1 08/02/2022 04:30 AM    GFRAA 56 08/02/2022 04:30 AM    LABGLOM 46 08/02/2022 04:30 AM    GLUCOSE 84 08/02/2022 04:30 AM    PROT 5.7 08/02/2022 04:30 AM    LABALBU 3.3 08/02/2022 04:30 AM    CALCIUM 8.8 08/02/2022 04:30 AM    BILITOT 0.6 08/02/2022 04:30 AM    ALKPHOS 69 08/02/2022 04:30 AM    AST 15 08/02/2022 04:30 AM    ALT <5 08/02/2022 04:30 AM       No results found for: PROTIME, INR    Lab Results   Component Value Date/Time    TSH 1.350 08/02/2022 04:30 AM       Lab Results   Component Value Date/Time    COLORU Yellow 08/01/2022 03:29 PM    PHUR 6.0 08/01/2022 03:29 PM    WBCUA 2-5 08/01/2022 03:29 PM    RBCUA NONE 08/01/2022 03:29 PM    BACTERIA RARE 08/01/2022 03:29 PM    CLARITYU Clear 08/01/2022 03:29 PM    SPECGRAV <=1.005 08/01/2022 03:29 PM    LEUKOCYTESUR MODERATE 08/01/2022 03:29 PM    UROBILINOGEN 0.2 08/01/2022 03:29 PM    BILIRUBINUR Negative 08/01/2022 03:29 PM    BLOODU Negative 08/01/2022 03:29 PM    GLUCOSEU Negative 08/01/2022 03:29 PM       No results found for: Xin Pounds, N0NAYMIY, PHART, THGBART, IHB0XVP, PO2ART, LZR6WAC  Radiology:  CTA PULMONARY W CONTRAST   Final Result   No evidence of pulmonary embolism or acute pulmonary abnormality. Cardiomegaly with dilated main pulmonary artery suggestive of pulmonary   arterial hypertension. XR CHEST (2 VW)   Final Result   No radiographic evidence of acute cardiopulmonary disease. Microbiology:  Pending  No results for input(s): BC in the last 72 hours. No results for input(s): ORG in the last 72 hours.   No results for input(s): Jossue Schwartz in the last 72 hours. Recent Labs     08/01/22  1529   STREPNEUMAGU Presumptive negative- suggests no current or recent  pneumococcal infection. Infection due to Strep pneumoniae cannot be  ruled out since the antigen present in the sample  may be below the detection limit of the test.  Normal Range:Presumptive Negative       Recent Labs     08/01/22  1529   LP1UAG Presumptive Negative -suggesting no recent or current infections  with Legionella pneumophila serogroup 1. Infection to Legionella cannot be ruled out since other serogroups  and species may cause infection, antigen may not be present in  early infection, or level of antigen may be below the  detection limit. Normal Range: Presumptive Negative       Recent Labs     08/02/22  0430   ASO <20     No results for input(s): CULTRESP in the last 72 hours. Assessment:  COVID infection: First time she was tested positive on July 20 s/p monoclonal antibody infusion in [place] on July 21. She is afebrile hemodynamically stable saturating well on room air,. her CT chest showed clear lungs. Her CRP is only 0.6 ESR, D-dimer, ferritin are normal  I do not think her symptomatology of shortness of breath is related to COVID.  please consider cardiac etiology given BNP of 1300 and troponin elevation. Rule out UTI: Patient does not have any urinary symptoms and UA is clean. Plan:    Stop ceftriaxone. No COVID treatments indicated at this time. Will follow with you    Thank you for having us see this patient in consultation. I will be discussing this case with the treating physicians.       Electronically signed by Virginia Apodaca MD on 8/2/2022 at 12:53 PM

## 2022-08-02 NOTE — PROGRESS NOTES
Occupational Therapy    OCCUPATIONAL THERAPY INITIAL EVALUATION     [facility], [address]                                                  Patient Name: Marlon Robert    MRN: [id number]    : 1926    Room: [address] [place] [address]      Evaluating OT: Redell Falling OTR/L   [id number]      Referring Provider:Enrique Reddy DO    Specific Provider Orders/Date:OT eval and treat 2022      Diagnosis:  Generalized weakness [R53.1]  MAZIN (acute kidney injury) ([facility] [address] [N17.9]  UTI (urinary tract infection) with pyuria [N39.0]     Pertinent Medical History: COVID 19      Precautions:  Fall Risk, DROPLET PLUS      Assessment of current deficits    [x] Functional mobility  [x]ADLs  [x] Strength               []Cognition    [x] Functional transfers   [x] IADLs         [x] Safety Awareness   [x]Endurance    [] Fine Coordination              [x] Balance      [] Vision/perception   []Sensation     []Gross Motor Coordination  [] ROM  [] Delirium                   [] Motor Control     OT PLAN OF CARE   OT POC based on physician orders, patient diagnosis and results of clinical assessment    Frequency/Duration  1-3 days/wk for 2 weeks PRN   Specific OT Treatment Interventions to include:   ADL retraining/adapted techniques and AE recommendations to increase functional independence within precautions                    Energy conservation techniques to improve tolerance for selfcare routine   Functional transfer/mobility training/DME recommendations for increased independence, safety and fall prevention         Patient/family education to increase safety and functional independence             Environmental modifications for safe mobility and completion of ADLs                             Therapeutic activity to improve functional performance during ADLs.                                          Therapeutic exercise to improve tolerance and functional strength for ADLs    Balance retraining/tolerance tasks for facilitation of postural control with dynamic challenges during ADLs . Positioning to improve functional independence      Recommended Adaptive Equipment: TBD     Home Living: Pt lives alone, 1 floor condo, no steps    Bathroom setup: walk in shower    Equipment owned: walker    Prior Level of Function: Independent  with ADLs , Independent  with IADLs; ambulated with no device       Pain Level: no pain   Cognition: A&O: 4/4;    Memory:  good    Sequencing:  good    Problem solving: fair +   Judgement/safety:  fair+     Functional Assessment:  AM-PAC Daily Activity Raw Score: 18/24   Initial Eval Status  Date: 8/2/22 Treatment Status  Date: STGs = LTGs  Time frame: 10-14 days   Feeding Independent      Grooming SBA/set-up   Independent    UB Dressing SBA/set-up   Independent    LB Dressing Min A  Mod I    Bathing Min A  Mod I    Toileting SBA   Independent    Bed Mobility  SBA  Supine to sit   Mod I    Functional Transfers SBA  Sit- stand from bed , commode  Mod i   Functional Mobility SBA, no device  To/from bathroom   Mod I  with good tolerance    Balance Sitting:     Static:  Independent     Dynamic:SBA   Standing: SBA   Independent    Activity Tolerance No SOB   On room air   Good  with ADL activity    Visual/  Perceptual Glasses: none by bedside                 Hand Dominance right   AROM (PROM) Strength Additional Info:    RUE  WFL WFL good  and wfl FMC/dexterity noted during ADL tasks       LUE WFL WFL good  and wfl FMC/dexterity noted during ADL tasks       Hearing: WFL   Sensation:  No c/o numbness or tingling   Tone: WFL   Edema: none by bedside    Comments: Upon arrival patient lying in bed . At end of session, patient siting in chair  with call light and phone within reach, all lines and tubes intact.   *ALARM ON, daughter present    Overall patient demonstrated  decreased independence and safety during completion of ADL/functional transfer/mobility tasks. Pt would benefit from continued skilled OT to increase safety and independence with completion of ADL/IADL tasks for functional independence and quality of life. Rehab Potential: good for established goals     Patient / Family Goal: return home      Patient and/or family were instructed on functional diagnosis, prognosis/goals and OT plan of care. Demonstrated good  understanding. Eval Complexity: Low    Time In: 1457  Time Out: 1515      Min Units   OT Eval Low 97165 x  1   OT Eval Medium 68863      OT Eval High 85591      OT Re-Eval [id number]       Therapeutic Ex 70311      Therapeutic Activities 70620       ADL/Self Care [id number]       Orthotic Management 76953       Manual [id number]     Neuro Re-Ed [id number]       Non-Billable Time         Evaluation Time additionally includes thorough review of current medical information, gathering information on past medical history/social history and prior level of function, interpretation of standardized testing/informal observation of tasks, assessment of data and development of plan of care and goals.             Tova Johnson  OTR/L  OT [id number]

## 2022-08-02 NOTE — CARE COORDINATION
Social Work / Discharge Planning :Observations status. Admitted with MAZIN/Weakness. COVID POSITIVE: Patient currently on RA. Patient admitted from [address] [place] [address] where she resides independently. Patient has PCP listed and insurance. Await therapy input to better assist with discharge planning. SW to follow.  Electronically signed by Sherolyn Canavan, LSW on 8/2/22 at 11:02 AM EDT

## 2022-08-02 NOTE — H&P
History and Physical      CHIEF COMPLAINT: Weakness fatigue cough and short of breath    History of Present Illness: 43-year-old female patient of Dr. Diana Carter I am asked to admit and follow. I spoke with the patient's nephew yesterday, Dr. Bobby Workman [facility] u[facility] who requested that I follow patient. I phoned daughter Rafael Puente today at [phone] to obtain further information. History also obtained from patient as well as second daughter. Patient developed COVID symptoms 7/20 and tested positive with fever as high as 99 with some mild diarrhea nausea and cough. Patient reported to be sluggish at times. Cough was productive of yellow sputum. She saw PCP in [facility] who recommended monoclonal IV antibody infusion which she underwent on 7/21. Despite the above there was not much improvement. She presented to the ED with the above symptoms and reported to be \"moderately severe\". Patient has been vaccinated twice for COVID-19 did not receive booster dose. --In the ED leukocyte esterase in the urine was positive nitrite was negative with rare bacteria. --Procalcitonin 0.  5, CRP 0.6. Ferritin 152. D-dimer less than 200.  -- History of atrial fibrillation, she has been on amiodarone and apixaban since with no recurrence  --Known aortic stenosis for which she follows with cardiology in [facility]  --In the ED physicians assumed she had acute kidney injury due to presenting BUN of 21 of creatinine 1.2. I reviewed her old records from 4/5/2022 done through [address] [place] [address] with a BUN of 17 and a creatinine of 1.21. Therefore, no acute kidney injury. --Chest x-ray in the ED was unremarkable. She then underwent CTA of the chest with following results--    FINDINGS:   Pulmonary Arteries: Pulmonary arteries are adequately opacified for   evaluation. No evidence of intraluminal filling defect to suggest pulmonary   embolism. Main pulmonary artery is dilated measuring 36 mm in diameter.      Mediastinum: No evidence of mediastinal lymphadenopathy. The heart and   pericardium demonstrate no acute abnormality. Cardiomegaly is noted. Prominent aortic valve calcification is seen. There is no acute abnormality   of the thoracic aorta. Lungs/pleura: The lungs are without acute process. No focal consolidation or   pulmonary edema. No evidence of pleural effusion or pneumothorax. Upper Abdomen: Limited images of the upper abdomen are unremarkable. Soft Tissues/Bones: No acute bone or soft tissue abnormality. Impression:       No evidence of pulmonary embolism or acute pulmonary abnormality. Cardiomegaly with dilated main pulmonary artery suggestive of pulmonary   arterial hypertension     --On admission proBNP slightly elevated 943, today elevated 1363. Troponins have been borderline but no significant increase. . B12 greater than 3210 folic acid greater than 20. Creatinine from today 1.1.  --Patient underwent molecular/PCR viral respiratory panel which was positive for SARS-CoV-2 by PCR. Legionella and strep antigens are negative. ASO titer is negative. WBC at 7.9 today. Past Medical History:   Diagnosis Date    CKD (chronic kidney disease) stage 3, GFR 30-59 ml/min ([facility]) 8/2/2022    Pulmonary hypertension ([address] 8/2/2022    UTI (urinary tract infection) with pyuria 8/1/2022         No past surgical history on file. Medications Prior to Admission:    Medications Prior to Admission: apixaban (ELIQUIS) 2.5 MG TABS tablet, Take 2.5 mg by mouth in the morning and 2.5 mg before bedtime. levothyroxine (SYNTHROID) 50 MCG tablet, Take 50 mcg by mouth in the morning. amiodarone (PACERONE) 100 MG tablet, Take 100 mg by mouth in the morning. Multiple Vitamins-Minerals (OCUVITE ADULT 50+) CAPS, Take 1 tablet by mouth daily  vitamin B-12 (CYANOCOBALAMIN) 500 MCG tablet, Take 500 mcg by mouth in the morning.   Cholecalciferol (VITAMIN D3) 125 MCG (5000 UT) TABS, Take 1 tablet by mouth in the morning. potassium chloride (MICRO-K) 10 MEQ extended release capsule, Take 10 mEq by mouth Twice a Week Monday and Friday wth the lasix  furosemide (LASIX) 20 MG tablet, Take 20 mg by mouth Twice a Week Monday and fridays  Flaxseed, Linseed, (FLAXSEED OIL) 1200 MG CAPS, Take 1 tablet by mouth daily    Allergies:    Patient has no known allergies. Social History:    reports that she has never smoked. She has never been exposed to tobacco smoke. She has never used smokeless tobacco. She reports that she does not drink alcohol and does not use drugs. Family History:   family history is not on file. REVIEW OF SYSTEMS:  As above in the HPI, otherwise negative    PHYSICAL EXAM:    VS: /60   Pulse 52   Temp 97.8 °F (36.6 °C) (Oral)   Resp 18   Ht 5' 3\" (1.6 m)   Wt 172 lb 9.6 oz (78.3 kg)   SpO2 95%   BMI 30.57 kg/m²     General appearance: Alert, Awake, Oriented times 3, no distress; states she is feeling better than admission; daughter present through the exam  Skin: Warm and dry ; no rashes  Head: Normocephalic. No masses, lesions or tenderness noted  Eyes: Conjunctivae pink, sclera white. PERRL,EOM-I  Ears: External ears normal  Nose/Sinuses: Nares normal. Septum midline. Mucosa normal. No drainage  Oropharynx: Oropharynx clear with no exudate seen  Neck: Supple. No jugular venous distension, lymphadenopathy or thyromegaly Trachea midline  Lungs: Bibasilar rales remainder of lungs clear  Heart: S1 S2  Regular rate and rhythm. No rub or gallop; grade 3/6 systolic murmur second right intercostal space  Abdomen: Soft, non-tender. BS normal. No masses, organomegaly; no rebound or guarding  Extremities: No edema, Peripheral pulses palpable  Musculoskeletal: Muscular strength appears intact. Neuro:  No focal motor defects ; II-XII grossly intact .  GORDON equally  Breast: deferred  Rectal: deferred  Genitalia:  deferred    LABS:  CBC:   Lab Results   Component Value Date/Time    WBC 7.9 08/02/2022 04:30 AM    RBC 4.32 08/02/2022 04:30 AM    HGB 12.8 08/02/2022 04:30 AM    HCT 38.5 08/02/2022 04:30 AM    MCV 89.1 08/02/2022 04:30 AM    MCH 29.6 08/02/2022 04:30 AM    MCHC 33.2 08/02/2022 04:30 AM    RDW 13.2 08/02/2022 04:30 AM     08/02/2022 04:30 AM    MPV 9.1 08/02/2022 04:30 AM     CBC with Differential:    Lab Results   Component Value Date/Time    WBC 7.9 08/02/2022 04:30 AM    RBC 4.32 08/02/2022 04:30 AM    HGB 12.8 08/02/2022 04:30 AM    HCT 38.5 08/02/2022 04:30 AM     08/02/2022 04:30 AM    MCV 89.1 08/02/2022 04:30 AM    MCH 29.6 08/02/2022 04:30 AM    MCHC 33.2 08/02/2022 04:30 AM    RDW 13.2 08/02/2022 04:30 AM    LYMPHOPCT 32.6 08/02/2022 04:30 AM    MONOPCT 10.6 08/02/2022 04:30 AM    BASOPCT 1.1 08/02/2022 04:30 AM    MONOSABS 0.84 08/02/2022 04:30 AM    LYMPHSABS 2.57 08/02/2022 04:30 AM    EOSABS 0.32 08/02/2022 04:30 AM    BASOSABS 0.09 08/02/2022 04:30 AM     Hemoglobin/Hematocrit:    Lab Results   Component Value Date/Time    HGB 12.8 08/02/2022 04:30 AM    HCT 38.5 08/02/2022 04:30 AM     CMP:    Lab Results   Component Value Date/Time     08/02/2022 04:30 AM    K 3.9 08/02/2022 04:30 AM    K 4.3 08/01/2022 05:44 PM     08/02/2022 04:30 AM    CO2 21 08/02/2022 04:30 AM    BUN 16 08/02/2022 04:30 AM    CREATININE 1.1 08/02/2022 04:30 AM    GFRAA 56 08/02/2022 04:30 AM    LABGLOM 46 08/02/2022 04:30 AM    GLUCOSE 84 08/02/2022 04:30 AM    PROT 5.7 08/02/2022 04:30 AM    LABALBU 3.3 08/02/2022 04:30 AM    CALCIUM 8.8 08/02/2022 04:30 AM    BILITOT 0.6 08/02/2022 04:30 AM    ALKPHOS 69 08/02/2022 04:30 AM    AST 15 08/02/2022 04:30 AM    ALT <5 08/02/2022 04:30 AM     BMP:    Lab Results   Component Value Date/Time     08/02/2022 04:30 AM    K 3.9 08/02/2022 04:30 AM    K 4.3 08/01/2022 05:44 PM     08/02/2022 04:30 AM    CO2 21 08/02/2022 04:30 AM    BUN 16 08/02/2022 04:30 AM    LABALBU 3.3 08/02/2022 04:30 AM    CREATININE 1.1 08/02/2022 04:30 AM    CALCIUM 8.8 08/02/2022 04:30 AM    GFRAA 56 08/02/2022 04:30 AM    LABGLOM 46 08/02/2022 04:30 AM    GLUCOSE 84 08/02/2022 04:30 AM     Hepatic Function Panel:    Lab Results   Component Value Date/Time    ALKPHOS 69 08/02/2022 04:30 AM    ALT <5 08/02/2022 04:30 AM    AST 15 08/02/2022 04:30 AM    PROT 5.7 08/02/2022 04:30 AM    BILITOT 0.6 08/02/2022 04:30 AM    BILIDIR <0.2 08/02/2022 04:30 AM    IBILI see below 08/02/2022 04:30 AM    LABALBU 3.3 08/02/2022 04:30 AM     Ionized Calcium:  No results found for: IONCA  Magnesium:    Lab Results   Component Value Date/Time    MG 2.2 08/02/2022 04:30 AM     Phosphorus:    Lab Results   Component Value Date/Time    PHOS 3.1 08/02/2022 04:30 AM     LDH:  No results found for: LDH  Uric Acid:    Lab Results   Component Value Date/Time    LABURIC 4.5 08/02/2022 04:30 AM     PT/INR:  No results found for: PROTIME, INR  Warfarin PT/INR:  No components found for: PTPATWAR, PTINRWAR  Troponin:  No results found for: TROPONINI  Last 3 Troponin:  No results found for: TROPONINI  U/A:    Lab Results   Component Value Date/Time    COLORU Yellow 08/01/2022 03:29 PM    PROTEINU Negative 08/01/2022 03:29 PM    PHUR 6.0 08/01/2022 03:29 PM    WBCUA 2-5 08/01/2022 03:29 PM    RBCUA NONE 08/01/2022 03:29 PM    BACTERIA RARE 08/01/2022 03:29 PM    CLARITYU Clear 08/01/2022 03:29 PM    SPECGRAV <=1.005 08/01/2022 03:29 PM    LEUKOCYTESUR MODERATE 08/01/2022 03:29 PM    UROBILINOGEN 0.2 08/01/2022 03:29 PM    BILIRUBINUR Negative 08/01/2022 03:29 PM    BLOODU Negative 08/01/2022 03:29 PM    GLUCOSEU Negative 08/01/2022 03:29 PM     HgBA1c:    Lab Results   Component Value Date/Time    LABA1C 5.6 08/02/2022 04:30 AM     FLP:    Lab Results   Component Value Date/Time    TRIG 67 08/02/2022 04:30 AM    HDL 53 08/02/2022 04:30 AM    LDLCALC 111 08/02/2022 04:30 AM    LABVLDL 13 08/02/2022 04:30 AM     TSH:    Lab Results   Component Value Date/Time    TSH 1.350 08/02/2022 04:30 AM VITAMIN B12: No components found for: B12  FOLATE:    Lab Results   Component Value Date/Time    FOLATE >20.0 08/02/2022 04:30 AM     IRON:    Lab Results   Component Value Date/Time    IRON 63 08/02/2022 04:30 AM     Iron Saturation:  No components found for: PERCENTFE  TIBC:    Lab Results   Component Value Date/Time    TIBC 197 08/02/2022 04:30 AM     FERRITIN:    Lab Results   Component Value Date/Time    FERRITIN 152 08/02/2022 04:30 AM       RADIOLOGY:  CTA PULMONARY W CONTRAST   Final Result   No evidence of pulmonary embolism or acute pulmonary abnormality. Cardiomegaly with dilated main pulmonary artery suggestive of pulmonary   arterial hypertension. XR CHEST (2 VW)   Final Result   No radiographic evidence of acute cardiopulmonary disease.              ASSESSMENT:      Active Hospital Problems    Diagnosis     Pulmonary hypertension ([facility] [I27.20]      Priority: Medium    CKD (chronic kidney disease) stage 3, GFR 30-59 ml/min ([facility]) [N18.30]      Priority: Medium    MAZIN (acute kidney injury) ([facility] [N17.9]      Priority: Medium    UTI (urinary tract infection) with pyuria [N39.0]      Priority: Medium       PLAN:  Medications discussed with patient  GI prophylaxis  DVT prophylaxis  Consultants notes reviewed  2D echo has been ordered  Cardiology and ID have been consulted  COVID isolation  Ceftriaxone 1 g IV every 24 hours  Await urine culture results  Discontinue IV fluids in view of increasing proBNP  Albuterol 2 puffs 4 times daily  Cordarone 100 mg daily  Apixaban 2.5 mg twice daily  Furosemide 20 mg Monday and Thursday each week  Synthroid 50 mcg daily  Potassium chloride twice weekly Monday and Thursday  Obtain vitamin D level  Discontinue B12 in view of above normal results  Monitor appropriate labs  Sputum gram stain          Please note that over 50 minutes was spent in evaluating the patient, review of records and results, discussion with staff/family, etc.    [address]

## 2022-08-02 NOTE — PROGRESS NOTES
Physical Therapy  Facility/Department: [address] INTERMEDIATE  Physical Therapy Initial Assessment    Name: Kassie Wright  :   MRN: [id number]  Date of Service: 2022           Patient Diagnosis(es): The primary encounter diagnosis was MAZIN (acute kidney injury) ([facility] A diagnosis of Generalized weakness was also pertinent to this visit. Past Medical History:  has a past medical history of Aortic stenosis, CKD (chronic kidney disease) stage 3, GFR 30-59 ml/min ([facility]), COVID-19, Pulmonary hypertension ([facility] and UTI (urinary tract infection) with pyuria. Past Surgical History:  has no past surgical history on file. Requires PT Follow-Up: Yes     Evaluating Therapist: Javier Junior PT     Referring Provider:  Lisette Willams DO    PT order : PT eval and treat     Room #: 832   DIAGNOSIS:  generalized weakness   Additional Pertinent History: COVID   PRECAUTIONS:  falls, droplet plus, [place]     Social:  Pt lives alone  in a  1  floor plan no  steps   to enter. Prior to admission pt walked with no AD, independent      Initial Evaluation  Date:  2022  Treatment      Short Term/ Long Term   Goals   Was pt agreeable to Eval/treatment? Yes      Does pt have pain? HA      Bed Mobility  Rolling:  independent   Supine to sit:  independent   Sit to supine:  independent   Scooting:  independent    Indpeendent    Transfers Sit to stand:  SBA   Stand to sit:  SBA   Stand pivot:  NT    Independent    Ambulation    15 and 25  feet with no AD  with  SBA    100  feet with  no AD  with  independent        Stair negotiation: ascended and descended NT   N/A    LE ROM  WFL     LE strength  4-/ 5   4/5    AM- PAC RAW score  19/ 24            Pt is alert and Oriented x  3      Balance: SBA  . NO LOB with short distance gait     Bed/Chair alarm: yes      ASSESSMENT  Pt displays functional ability as noted in the objective portion of this evaluation.         Conditions Requiring Skilled Therapeutic Intervention:    [x]Decreased strength     []Decreased ROM  [x]Decreased functional mobility  [x]Decreased balance   [x]Decreased endurance   []Decreased posture  []Decreased sensation  []Decreased coordination   []Decreased vision  []Decreased safety awareness   []Increased pain         Treatment/Education:    Pt in bed  upon arrival ; agreeable to PT. Mobility as above. Pt's daughter present for session. SpO2 > 97% throughout session     Pt educated on fall risk,  safety with mobility        Patient response to education:   Pt verbalized understanding Pt demonstrated skill Pt requires further education in this area   x  x       Comments:  Pt left  in chair after session, with call light in reach. Rehab potential is Good for reaching above PT goals. Pts/ family goals   1. None stated     Patient and or family understand(s) diagnosis, prognosis, and plan of care. -  yes     PLAN  PT care will be provided in accordance with the objectives noted above. Whenever appropriate, clear delegation orders will be provided for nursing staff. Exercises and functional mobility practice will be used as well as appropriate assistive devices or modalities to obtain goals. Patient and family education will also be administered as needed.         PLAN OF CARE:    Current Treatment Recommendations     [x] Strengthening to improve independence with functional mobility   [] ROM to improve independence with functional mobility   [x] Balance Training to improve static/dynamic balance and to reduce fall risk  [x] Endurance Training to improve activity tolerance during functional mobility   [x] Transfer Training to improve safety and independence with all functional transfers   [x] Gait Training to improve gait mechanics, endurance and assess need for appropriate assistive device  [] Stair Training in preparation for safe discharge home and/or into the community   [x] Positioning to prevent skin breakdown and contractures  [x]

## 2022-08-02 NOTE — PROGRESS NOTES
P Quality Flow/Interdisciplinary Rounds Progress Note        Quality Flow Rounds held on August 2, 2022    Disciplines Attending:  Bedside Nurse, , and     Jaime Bahena was admitted on 8/1/2022  2:31 PM    Anticipated Discharge Date:       Disposition:    Dayne Score:  Dayne Scale Score: 20    Readmission Risk              Risk of Unplanned Readmission:  0           Discussed patient goal for the day, patient clinical progression, and barriers to discharge. The following Goal(s) of the Day/Commitment(s) have been identified:   CHAPITO Mendoza.       Anna Quintero RN  August 2, 2022

## 2022-08-03 VITALS
HEIGHT: 63 IN | DIASTOLIC BLOOD PRESSURE: 69 MMHG | RESPIRATION RATE: 18 BRPM | BODY MASS INDEX: 30.58 KG/M2 | SYSTOLIC BLOOD PRESSURE: 132 MMHG | HEART RATE: 58 BPM | TEMPERATURE: 97.8 F | OXYGEN SATURATION: 93 % | WEIGHT: 172.6 LBS

## 2022-08-03 LAB
ALBUMIN SERPL-MCNC: 3.3 G/DL (ref 3.5–5.2)
ALP BLD-CCNC: 72 U/L (ref 35–104)
ALT SERPL-CCNC: 5 U/L (ref 0–32)
ANION GAP SERPL CALCULATED.3IONS-SCNC: 12 MMOL/L (ref 7–16)
AST SERPL-CCNC: 14 U/L (ref 0–31)
BASOPHILS ABSOLUTE: 0.08 E9/L (ref 0–0.2)
BASOPHILS RELATIVE PERCENT: 1.1 % (ref 0–2)
BILIRUB SERPL-MCNC: 0.6 MG/DL (ref 0–1.2)
BILIRUBIN DIRECT: <0.2 MG/DL (ref 0–0.3)
BILIRUBIN, INDIRECT: ABNORMAL MG/DL (ref 0–1)
BUN BLDV-MCNC: 21 MG/DL (ref 6–23)
CALCIUM SERPL-MCNC: 8.8 MG/DL (ref 8.6–10.2)
CHLORIDE BLD-SCNC: 111 MMOL/L (ref 98–107)
CO2: 20 MMOL/L (ref 22–29)
CREAT SERPL-MCNC: 1.2 MG/DL (ref 0.5–1)
EOSINOPHILS ABSOLUTE: 0.34 E9/L (ref 0.05–0.5)
EOSINOPHILS RELATIVE PERCENT: 4.6 % (ref 0–6)
GFR AFRICAN AMERICAN: 50
GFR NON-AFRICAN AMERICAN: 42 ML/MIN/1.73
GLUCOSE BLD-MCNC: 98 MG/DL (ref 74–99)
HCT VFR BLD CALC: 39.5 % (ref 34–48)
HEMOGLOBIN: 13 G/DL (ref 11.5–15.5)
IMMATURE GRANULOCYTES #: 0.02 E9/L
IMMATURE GRANULOCYTES %: 0.3 % (ref 0–5)
LACTIC ACID, SEPSIS: 1.6 MMOL/L (ref 0.5–1.9)
LYMPHOCYTES ABSOLUTE: 2.33 E9/L (ref 1.5–4)
LYMPHOCYTES RELATIVE PERCENT: 31.6 % (ref 20–42)
MAGNESIUM: 2.1 MG/DL (ref 1.6–2.6)
MCH RBC QN AUTO: 29.5 PG (ref 26–35)
MCHC RBC AUTO-ENTMCNC: 32.9 % (ref 32–34.5)
MCV RBC AUTO: 89.6 FL (ref 80–99.9)
MONOCYTES ABSOLUTE: 0.76 E9/L (ref 0.1–0.95)
MONOCYTES RELATIVE PERCENT: 10.3 % (ref 2–12)
NEUTROPHILS ABSOLUTE: 3.85 E9/L (ref 1.8–7.3)
NEUTROPHILS RELATIVE PERCENT: 52.1 % (ref 43–80)
PDW BLD-RTO: 13.3 FL (ref 11.5–15)
PHOSPHORUS: 3.4 MG/DL (ref 2.5–4.5)
PLATELET # BLD: 260 E9/L (ref 130–450)
PMV BLD AUTO: 9.3 FL (ref 7–12)
POTASSIUM SERPL-SCNC: 4 MMOL/L (ref 3.5–5)
RBC # BLD: 4.41 E12/L (ref 3.5–5.5)
SEDIMENTATION RATE, ERYTHROCYTE: 5 MM/HR (ref 0–20)
SODIUM BLD-SCNC: 143 MMOL/L (ref 132–146)
TOTAL PROTEIN: 5.7 G/DL (ref 6.4–8.3)
URINE CULTURE, ROUTINE: NORMAL
VITAMIN D 25-HYDROXY: 47 NG/ML (ref 30–100)
WBC # BLD: 7.4 E9/L (ref 4.5–11.5)

## 2022-08-03 PROCEDURE — 80076 HEPATIC FUNCTION PANEL: CPT

## 2022-08-03 PROCEDURE — 85651 RBC SED RATE NONAUTOMATED: CPT

## 2022-08-03 PROCEDURE — 82306 VITAMIN D 25 HYDROXY: CPT

## 2022-08-03 PROCEDURE — 85025 COMPLETE CBC W/AUTO DIFF WBC: CPT

## 2022-08-03 PROCEDURE — G0378 HOSPITAL OBSERVATION PER HR: HCPCS

## 2022-08-03 PROCEDURE — 6370000000 HC RX 637 (ALT 250 FOR IP): Performed by: INTERNAL MEDICINE

## 2022-08-03 PROCEDURE — 84100 ASSAY OF PHOSPHORUS: CPT

## 2022-08-03 PROCEDURE — 83735 ASSAY OF MAGNESIUM: CPT

## 2022-08-03 PROCEDURE — 36415 COLL VENOUS BLD VENIPUNCTURE: CPT

## 2022-08-03 PROCEDURE — 2580000003 HC RX 258: Performed by: INTERNAL MEDICINE

## 2022-08-03 PROCEDURE — 83605 ASSAY OF LACTIC ACID: CPT

## 2022-08-03 PROCEDURE — 80048 BASIC METABOLIC PNL TOTAL CA: CPT

## 2022-08-03 RX ORDER — ALBUTEROL SULFATE 90 UG/1
2 AEROSOL, METERED RESPIRATORY (INHALATION) 4 TIMES DAILY
Qty: 18 G | Refills: 0 | Status: SHIPPED | OUTPATIENT
Start: 2022-08-03 | End: 2022-08-13

## 2022-08-03 RX ADMIN — SODIUM CHLORIDE, PRESERVATIVE FREE 10 ML: 5 INJECTION INTRAVENOUS at 09:40

## 2022-08-03 RX ADMIN — MULTIPLE VITAMINS W/ MINERALS TAB 1 TABLET: TAB at 09:41

## 2022-08-03 RX ADMIN — APIXABAN 2.5 MG: 2.5 TABLET, FILM COATED ORAL at 09:41

## 2022-08-03 RX ADMIN — ALBUTEROL SULFATE 2 PUFF: 90 AEROSOL, METERED RESPIRATORY (INHALATION) at 09:41

## 2022-08-03 RX ADMIN — PANTOPRAZOLE SODIUM 40 MG: 40 TABLET, DELAYED RELEASE ORAL at 06:21

## 2022-08-03 RX ADMIN — LEVOTHYROXINE SODIUM 50 MCG: 0.05 TABLET ORAL at 06:21

## 2022-08-03 RX ADMIN — Medication 1000 UNITS: at 09:41

## 2022-08-03 RX ADMIN — AMIODARONE HYDROCHLORIDE 100 MG: 200 TABLET ORAL at 09:41

## 2022-08-03 ASSESSMENT — PAIN SCALES - GENERAL
PAINLEVEL_OUTOF10: 0
PAINLEVEL_OUTOF10: 0

## 2022-08-03 NOTE — PROGRESS NOTES
[facility] for d/c per Dr Quan Ladd with OP follow up once improved from the COVID to get her aortic valve fixed. Will relay info to Dr Hedy Duque.

## 2022-08-03 NOTE — PROGRESS NOTES
PROGRESS  NOTE --                                                          INTERNAL  MEDICINE                                                                              I  PERSONALLY SAW , EXAMINED, AND CARED [address], 8/3/2022     LABS, XRAY ,CHART, AND MEDICATIONS  REVIEWED BY ME       Chief complaint: Weakness, fatigue, cough, short of breath      4/5/[id number]: Kati Swift is alert awake and cooperative; oriented ×3. Denies any chest pain dyspnea nausea emesis. Tolerating diet. No abdominal pain. Sitting in bedside chair eating her lunch hoping for discharge soon. Afebrile last 24 hours. SPO2 93 on room air. Blood pressure 132/69. Intake and output +1603 cc. Chloride 111 CO2 20 creatinine 1.2 BUN 21. Protein 5.7 magnesium 2.1. CRP pending. ESR 5. Blood cultures negative to date, urine culture pending. 2D echo from yesterday as follows--     Summary   Left ventricle grossly normal in size. Left ventricle grossly normal in size. Mild left ventricular concentric hypertrophy noted. Normal LV segmental wall motion. Estimated left ventricular ejection fraction is 67±5%. Diastolic function cannot be accurately assessed due to significant   valvular disease. The LAESV Index is >34 ml/m2. Mildly dilated right ventricle. TAPSE is normal   Mild mitral regurgitation is present. Decreased aortic valve leaflet excursion. The aortic valve appears severely sclerotic. Mild aortic regurgitation is noted. Severe aortic stenosis is present. The mean gradient across the aortic valve is calculated as 63 mmHg by   doppler. The aortic valve dimensionless index is 0.24 . Mild tricuspid regurgitation. RVSP is 44 mmHg. Technically good quality study. No comparison study available. Suggest clinical correlation. Awaiting outside cardiologist recent 2D echo.   ID consult from yesterday appreciated. No need for treatment of SARS-CoV-2 but needs to remain in droplet isolation. Physical therapy AMPAC score from yesterday 19/24. ID note from today, on room air feels short of breath with walking longer distances. No COVID treatment indications at this time.     Objective:     PHYSICAL EXAM:    VS: /69   Pulse 58   Temp 97.8 °F (36.6 °C) (Oral)   Resp 18   Ht 5' 3\" (1.6 m)   Wt 172 lb 9.6 oz (78.3 kg)   SpO2 93%   BMI 30.57 kg/m²     Labs:   CBC:   Lab Results   Component Value Date/Time    WBC 7.4 08/03/2022 04:40 AM    RBC 4.41 08/03/2022 04:40 AM    HGB 13.0 08/03/2022 04:40 AM    HCT 39.5 08/03/2022 04:40 AM    MCV 89.6 08/03/2022 04:40 AM    MCH 29.5 08/03/2022 04:40 AM    MCHC 32.9 08/03/2022 04:40 AM    RDW 13.3 08/03/2022 04:40 AM     08/03/2022 04:40 AM    MPV 9.3 08/03/2022 04:40 AM     CBC with Differential:    Lab Results   Component Value Date/Time    WBC 7.4 08/03/2022 04:40 AM    RBC 4.41 08/03/2022 04:40 AM    HGB 13.0 08/03/2022 04:40 AM    HCT 39.5 08/03/2022 04:40 AM     08/03/2022 04:40 AM    MCV 89.6 08/03/2022 04:40 AM    MCH 29.5 08/03/2022 04:40 AM    MCHC 32.9 08/03/2022 04:40 AM    RDW 13.3 08/03/2022 04:40 AM    LYMPHOPCT 31.6 08/03/2022 04:40 AM    MONOPCT 10.3 08/03/2022 04:40 AM    BASOPCT 1.1 08/03/2022 04:40 AM    MONOSABS 0.76 08/03/2022 04:40 AM    LYMPHSABS 2.33 08/03/2022 04:40 AM    EOSABS 0.34 08/03/2022 04:40 AM    BASOSABS 0.08 08/03/2022 04:40 AM     Hemoglobin/Hematocrit:    Lab Results   Component Value Date/Time    HGB 13.0 08/03/2022 04:40 AM    HCT 39.5 08/03/2022 04:40 AM     CMP:    Lab Results   Component Value Date/Time     08/03/2022 04:40 AM    K 4.0 08/03/2022 04:40 AM    K 4.3 08/01/2022 05:44 PM     08/03/2022 04:40 AM    CO2 20 08/03/2022 04:40 AM    BUN 21 08/03/2022 04:40 AM    CREATININE 1.2 08/03/2022 04:40 AM    GFRAA 50 08/03/2022 04:40 AM    LABGLOM 42 08/03/2022 04:40 AM    GLUCOSE 98 08/03/2022 04:40 AM    PROT 5.7 08/03/2022 04:40 AM    LABALBU 3.3 08/03/2022 04:40 AM    CALCIUM 8.8 08/03/2022 04:40 AM    BILITOT 0.6 08/03/2022 04:40 AM    ALKPHOS 72 08/03/2022 04:40 AM    AST 14 08/03/2022 04:40 AM    ALT 5 08/03/2022 04:40 AM     BMP:    Lab Results   Component Value Date/Time     08/03/2022 04:40 AM    K 4.0 08/03/2022 04:40 AM    K 4.3 08/01/2022 05:44 PM     08/03/2022 04:40 AM    CO2 20 08/03/2022 04:40 AM    BUN 21 08/03/2022 04:40 AM    LABALBU 3.3 08/03/2022 04:40 AM    CREATININE 1.2 08/03/2022 04:40 AM    CALCIUM 8.8 08/03/2022 04:40 AM    GFRAA 50 08/03/2022 04:40 AM    LABGLOM 42 08/03/2022 04:40 AM    GLUCOSE 98 08/03/2022 04:40 AM     Hepatic Function Panel:    Lab Results   Component Value Date/Time    ALKPHOS 72 08/03/2022 04:40 AM    ALT 5 08/03/2022 04:40 AM    AST 14 08/03/2022 04:40 AM    PROT 5.7 08/03/2022 04:40 AM    BILITOT 0.6 08/03/2022 04:40 AM    BILIDIR <0.2 08/03/2022 04:40 AM    IBILI see below 08/03/2022 04:40 AM    LABALBU 3.3 08/03/2022 04:40 AM     Ionized Calcium:  No results found for: IONCA  Magnesium:    Lab Results   Component Value Date/Time    MG 2.1 08/03/2022 04:40 AM     Phosphorus:    Lab Results   Component Value Date/Time    PHOS 3.4 08/03/2022 04:40 AM     LDH:  No results found for: LDH  Uric Acid:    Lab Results   Component Value Date/Time    LABURIC 4.5 08/02/2022 04:30 AM     PT/INR:  No results found for: PROTIME, INR  Warfarin PT/INR:  No components found for: PTPATWAR, PTINRWAR  PTT:  No results found for: APTT, PTT[APTT}  Troponin:  No results found for: TROPONINI  Last 3 Troponin:  No results found for: TROPONINI  U/A:    Lab Results   Component Value Date/Time    COLORU Yellow 08/01/2022 03:29 PM    PROTEINU Negative 08/01/2022 03:29 PM    PHUR 6.0 08/01/2022 03:29 PM    WBCUA 2-5 08/01/2022 03:29 PM    RBCUA NONE 08/01/2022 03:29 PM    BACTERIA RARE 08/01/2022 03:29 PM    CLARITYU Clear 08/01/2022 03:29 PM    SPECGRAV <=1.005 08/01/2022 03:29 PM    LEUKOCYTESUR MODERATE 08/01/2022 03:29 PM    UROBILINOGEN 0.2 08/01/2022 03:29 PM    BILIRUBINUR Negative 08/01/2022 03:29 PM    BLOODU Negative 08/01/2022 03:29 PM    GLUCOSEU Negative 08/01/2022 03:29 PM     HgBA1c:    Lab Results   Component Value Date/Time    LABA1C 5.6 08/02/2022 04:30 AM     FLP:    Lab Results   Component Value Date/Time    TRIG 67 08/02/2022 04:30 AM    HDL 53 08/02/2022 04:30 AM    LDLCALC 111 08/02/2022 04:30 AM    LABVLDL 13 08/02/2022 04:30 AM     TSH:    Lab Results   Component Value Date/Time    TSH 1.350 08/02/2022 04:30 AM     VITAMIN B12: No components found for: B12  FOLATE:    Lab Results   Component Value Date/Time    FOLATE >20.0 08/02/2022 04:30 AM     IRON:    Lab Results   Component Value Date/Time    IRON 63 08/02/2022 04:30 AM     Iron Saturation:  No components found for: PERCENTFE  TIBC:    Lab Results   Component Value Date/Time    TIBC 197 08/02/2022 04:30 AM     FERRITIN:    Lab Results   Component Value Date/Time    FERRITIN 152 08/02/2022 04:30 AM        General appearance: Alert, Awake, Oriented times 3, no distress  Skin: Warm and dry ; no rashes  Head: Normocephalic. No masses, lesions or tenderness noted  Eyes: Conjunctivae pink, sclera white. PERRL,EOM-I  Ears: External ears normal  Nose/Sinuses: Nares normal. Septum midline. Mucosa normal. No drainage  Oropharynx: Oropharynx clear with no exudate seen  Neck: Supple. No jugular venous distension, lymphadenopathy or thyromegaly Trachea midline  Lungs: Clear to auscultation bilaterally. No rhonchi, crackles or wheezes  Heart: S1 S2  Regular rate and rhythm. No rub or gallop; grade 3/6 systolic murmur second right intercostal space  Abdomen: Soft, non-tender. BS normal. No masses, organomegaly; no rebound or guarding  Extremities: No edema, Peripheral pulses palpable  Musculoskeletal: Muscular strength appears intact. Neuro:  No focal motor defects ; II-XII grossly intact .  GORDON equally    TELEMETRY: REVIEWED--Telemetry: Sinus    ASSESSMENT:   Principal Problem:    UTI (urinary tract infection) with pyuria  Active Problems:    MAZIN (acute kidney injury) ([facility] [address]    Pulmonary hypertension ([facility])    CKD (chronic kidney disease) stage 3, GFR 30-59 ml/min ([facility])    COVID-19    Aortic stenosis  Resolved Problems:    * No resolved hospital problems. *      PLAN:  SEE ORDERS      RE  CHANGES AND FINDINGS   Medications reviewed with patient  GI prophylaxis  DVT prophylaxis  Consultants notes reviewed  ID has discontinued ceftriaxone IV  Await results of urine culture  Apixaban 2.5 mg daily  Amiodarone 100 mg daily  Furosemide 20 mg twice weekly  Potassium chloride 10 mEq twice weekly    Discussed with patient and nursing. Sb Reddy DO     10:37 AM     8/3/2022    TIME > 25 MINUTES    >  50 %  OF  TIME  DISCUSSION               ------------  INFORMATION  -----------      DIET:ADULT DIET; Regular; Low Fat/Low Chol/High Fiber/DONNA      No Known Allergies      MEDICATION SIDE EFFECTS:none       SCHEDULED MEDS:                                 Scheduled Meds:   amiodarone  100 mg Oral Daily    apixaban  2.5 mg Oral BID    Vitamin D  1,000 Units Oral Daily    furosemide  20 mg Oral Once per day on Mon Thu    levothyroxine  50 mcg Oral Daily    potassium chloride  10 mEq Oral Once per day on Mon Thu    sodium chloride flush  5-40 mL IntraVENous BID    therapeutic multivitamin-minerals  1 tablet Oral Daily    albuterol sulfate HFA  2 puff Inhalation 4x daily    pantoprazole  40 mg Oral QAM AC       Continuous Infusions:      Data:       Intake/Output Summary (Last 24 hours) at 8/3/2022 1037  Last data filed at 8/2/2022 1800  Gross per 24 hour   Intake 360 ml   Output --   Net 360 ml       Wt Readings from Last 3 Encounters:   08/02/22 172 lb 9.6 oz (78.3 kg)       Labs: Additional    GLUCOSE:No results for input(s): POCGLU in the last 72 hours.     BNP:No results found for: BNP    CRP:   Recent Labs     08/02/22  0430   CRP 0.6*       ESR:  Recent Labs     08/02/22  0430 08/03/22  0440   SEDRATE 12 5       RADIOLOGY: REVIEWED AVAILABLE REPORT  CTA PULMONARY W CONTRAST   Final Result   No evidence of pulmonary embolism or acute pulmonary abnormality. Cardiomegaly with dilated main pulmonary artery suggestive of pulmonary   arterial hypertension. XR CHEST (2 VW)   Final Result   No radiographic evidence of acute cardiopulmonary disease.                    [address],     10:37 AM     8/3/2022      Voice recognition software used for dictation

## 2022-08-03 NOTE — PLAN OF CARE
Problem: Discharge Planning  Goal: Discharge to home or other facility with appropriate resources  8/3/2022 1019 by Joel De Leon RN  Outcome: Progressing  8/3/2022 0650 by Paulina Morales RN  Outcome: Progressing  Flowsheets (Taken 8/2/2022 2055)  Discharge to home or other facility with appropriate resources: Identify barriers to discharge with patient and caregiver     Problem: Pain  Goal: Verbalizes/displays adequate comfort level or baseline comfort level  8/3/2022 1019 by Joel De Leon RN  Outcome: Progressing  8/3/2022 0650 by Paulina Morales RN  Outcome: Progressing  Flowsheets (Taken 8/2/2022 2055)  Verbalizes/displays adequate comfort level or baseline comfort level: Encourage patient to monitor pain and request assistance     Problem: Respiratory - Adult  Goal: Achieves optimal ventilation and oxygenation  8/3/2022 1019 by Joel De Leon RN  Outcome: Progressing  8/3/2022 0650 by Paulina Morales RN  Outcome: Progressing  Flowsheets (Taken 8/2/2022 2055)  Achieves optimal ventilation and oxygenation: Assess for changes in respiratory status     Problem: Infection - Adult  Goal: Absence of infection at discharge  8/3/2022 1019 by Joel De Leon RN  Outcome: Progressing  8/3/2022 0650 by Paulina Morales RN  Outcome: Progressing  Flowsheets (Taken 8/2/2022 2055)  Absence of infection at discharge:   Assess and monitor for signs and symptoms of infection   Monitor lab/diagnostic results   Instruct and encourage patient and family to use good hand hygiene technique     Problem: Metabolic/Fluid and Electrolytes - Adult  Goal: Hemodynamic stability and optimal renal function maintained  8/3/2022 1019 by Joel De Leon RN  Outcome: Progressing  8/3/2022 0650 by Paulina Morales RN  Outcome: Progressing  Flowsheets (Taken 8/2/2022 2055)  Hemodynamic stability and optimal renal function maintained: Monitor labs and assess for signs and symptoms of volume excess or deficit Problem: Cardiovascular - Adult  Goal: Maintains optimal cardiac output and hemodynamic stability  8/3/2022 1019 by Denice Chilel RN  Outcome: Progressing  8/3/2022 0650 by Author Jillian RN  Outcome: Progressing  Flowsheets (Taken 8/2/2022 2055)  Maintains optimal cardiac output and hemodynamic stability: Monitor blood pressure and heart rate     Problem: Safety - Adult  Goal: Free from fall injury  8/3/2022 1019 by Denice Chilel RN  Outcome: Progressing  8/3/2022 0650 by Author Jillian RN  Outcome: Progressing     Problem: ABCDS Injury Assessment  Goal: Absence of physical injury  8/3/2022 1019 by Denice Chilel RN  Outcome: Progressing  8/3/2022 0650 by Author Jillian RN  Outcome: Progressing

## 2022-08-03 NOTE — PROGRESS NOTES
8/3/2022 discharge instructions given with verbal understanding of medications and follow up. Iv removed tele removed.

## 2022-08-03 NOTE — PROGRESS NOTES
[address] [place] [address] [facility]  Progress Note    SUBJECTIVE:  Chief Complaint   Patient presents with    Shortness of Breath    Fatigue     covid positive x a few weeks ago, weakness, covid antibodies  ( sent by Dr Shruthi Tompkins)     Patient is tolerating medications. No reported adverse drug reactions. No nausea, vomiting, diarrhea. Up in chair, feeling a lot better then she did  On RA, only feels SOB when walking longer distances but denies SOB when she walks around the room   No fevers    Review of systems:  As stated above in the chief complaint, otherwise negative. Medications:  Scheduled Meds:   amiodarone  100 mg Oral Daily    apixaban  2.5 mg Oral BID    Vitamin D  1,000 Units Oral Daily    furosemide  20 mg Oral Once per day on     levothyroxine  50 mcg Oral Daily    potassium chloride  10 mEq Oral Once per day on     sodium chloride flush  5-40 mL IntraVENous BID    therapeutic multivitamin-minerals  1 tablet Oral Daily    albuterol sulfate HFA  2 puff Inhalation 4x daily    pantoprazole  40 mg Oral QAM AC     Continuous Infusions:  PRN Meds:sodium chloride flush, acetaminophen    OBJECTIVE:  /69   Pulse 58   Temp 97.8 °F (36.6 °C) (Oral)   Resp 18   Ht 5' 3\" (1.6 m)   Wt 172 lb 9.6 oz (78.3 kg)   SpO2 93%   BMI 30.57 kg/m²   Temp  Av.9 °F (36.6 °C)  Min: 97.8 °F (36.6 °C)  Max: 98.1 °F (36.7 °C)  Constitutional: The patient is awake, alert, and oriented. Skin: Warm and dry. No rashes were noted. HEENT: Round and reactive pupils. Moist mucous membranes. No ulcerations or thrush. Neck: Supple to movements. Chest: No use of accessory muscles to breathe. Symmetrical expansion. No wheezing, crackles or rhonchi. Cardiovascular: S1 and S2 are rhythmic and regular. Systolic murmur ++  Abdomen: Positive bowel sounds to auscultation. Benign to palpation. No masses felt. No hepatosplenomegaly. Extremities: No clubbing, no cyanosis, no edema.   Lines: to be discharged from ID stand point. Monitor labs    DANIELLE Gibson CNP  10:07 AM  8/3/2022     Pt seen and examined. Above discussed agree with advanced practice nurse. Labs, cultures, and radiographs reviewed. Face to Face encounter occurred. Changes made as necessary.      Sina Adams MD

## 2022-08-03 NOTE — PLAN OF CARE
Problem: Discharge Planning  Goal: Discharge to home or other facility with appropriate resources  8/3/2022 1427 by Italo Singh RN  Outcome: Completed  8/3/2022 1019 by Corey Esteves RN  Outcome: Progressing  8/3/2022 0650 by Vivi Loving RN  Outcome: Progressing  Flowsheets (Taken 8/2/2022 2055)  Discharge to home or other facility with appropriate resources: Identify barriers to discharge with patient and caregiver     Problem: Pain  Goal: Verbalizes/displays adequate comfort level or baseline comfort level  8/3/2022 1427 by Italo Singh RN  Outcome: Completed  8/3/2022 1019 by Corey Esteves RN  Outcome: Progressing  8/3/2022 0650 by Vivi Loving RN  Outcome: Progressing  Flowsheets (Taken 8/2/2022 2055)  Verbalizes/displays adequate comfort level or baseline comfort level: Encourage patient to monitor pain and request assistance     Problem: Respiratory - Adult  Goal: Achieves optimal ventilation and oxygenation  8/3/2022 1427 by Italo Singh RN  Outcome: Completed  8/3/2022 1019 by Corey Esteves RN  Outcome: Progressing  8/3/2022 0650 by Vivi Loving RN  Outcome: Progressing  Flowsheets (Taken 8/2/2022 2055)  Achieves optimal ventilation and oxygenation: Assess for changes in respiratory status     Problem: Infection - Adult  Goal: Absence of infection at discharge  8/3/2022 1427 by Italo Singh RN  Outcome: Completed  8/3/2022 1019 by Corey Esteves RN  Outcome: Progressing  8/3/2022 0650 by Vivi Loving RN  Outcome: Progressing  Flowsheets (Taken 8/2/2022 2055)  Absence of infection at discharge:   Assess and monitor for signs and symptoms of infection   Monitor lab/diagnostic results   Instruct and encourage patient and family to use good hand hygiene technique     Problem: Metabolic/Fluid and Electrolytes - Adult  Goal: Hemodynamic stability and optimal renal function maintained  8/3/2022 1427 by Italo Singh RN  Outcome: Completed  8/3/2022 1019 by Javi Tello RN  Outcome: Progressing  8/3/2022 0650 by Elian Mccullough RN  Outcome: Progressing  Flowsheets (Taken 8/2/2022 2055)  Hemodynamic stability and optimal renal function maintained: Monitor labs and assess for signs and symptoms of volume excess or deficit     Problem: Cardiovascular - Adult  Goal: Maintains optimal cardiac output and hemodynamic stability  8/3/2022 1427 by Emerita Nicholson RN  Outcome: Completed  8/3/2022 1019 by Javi Tello RN  Outcome: Progressing  8/3/2022 0650 by Elian Mccullough RN  Outcome: Progressing  Flowsheets (Taken 8/2/2022 2055)  Maintains optimal cardiac output and hemodynamic stability: Monitor blood pressure and heart rate     Problem: Safety - Adult  Goal: Free from fall injury  8/3/2022 1427 by Emerita Nicholson RN  Outcome: Completed  8/3/2022 1019 by Javi Tello RN  Outcome: Progressing  8/3/2022 0650 by Elian Mccullough RN  Outcome: Progressing     Problem: ABCDS Injury Assessment  Goal: Absence of physical injury  8/3/2022 1427 by Emerita Nicholson RN  Outcome: Completed  8/3/2022 1019 by Javi Tello RN  Outcome: Progressing  8/3/2022 0650 by Elian Mccullough RN  Outcome: Progressing

## 2022-08-03 NOTE — PROGRESS NOTES
CLINICAL PHARMACY NOTE: MEDS TO BEDS    Total # of Prescriptions Filled: 0   The following medications were delivered to the patient:      Additional Documentation:   Outpatient pharmacy did not fill patients albuterol hfa, daughter says she is being sent home with one from the floor, patient can have home pharmacy call us to transfer out the one we have on hold in patients profile.

## 2022-08-04 ENCOUNTER — CARE COORDINATION (OUTPATIENT)
Dept: CASE MANAGEMENT | Age: 88
End: 2022-08-04

## 2022-08-04 NOTE — CARE COORDINATION
understanding. HFU appts reviewed. Pt prefers to call and schedule her own appts. She plans on calling her pcp and Dr. Drew Hale office today to schedule appts. Pt voiced no needs/concerns. Pt was agreeable to continued outreaches from this CTN. Reviewed New or Changed Meds:  Reviewed new medications (Albuterol). Has at home. Full medication review completed. No 1111F entered, as non MH pcp. Do you have what you need at home? Durable Medical Equipment ordered at discharge: None  Home Health/Outpatient orders at discharge: none  Was patient discharged with a pulse oximeter? Pt has her own, but has not checked since discharging home. Discussed when to notify provider or seek emergency care. Patient education provided: Reviewed appropriate site of care based on symptoms and resources available to patient including: PCP  Specialist  When to call 911. Follow up appointment scheduled within 7 days of discharge: no. If no appointment scheduled, scheduling offered:  Yes, but pt declined assistance from CTN to help with scheduling her appts. No future appointments. Interventions: Scheduled appointment with PCP-See above  Scheduled appointment with Specialist-Dr. Ramirez~advised to f/u after she recovers from COVID-19 to discuss aortic valve issues  Obtained and reviewed discharge summary and/or continuity of care documents  Assessment and support for treatment adherence and medication management-Med review complete. Reviewed discharge instructions, medical action plan and red flags with patient who verbalized understanding. Plan for follow-up call in 10-14 days based on severity of symptoms and risk factors. Plan for next call: symptom management-Any worsening s/sx COVID? follow up appointment-Has pt scheduled a HFU with her pcp and Dr. Sherri Wells? Provided contact information for future needs.     Lupe Verduzco RN

## 2022-08-06 LAB
BLOOD CULTURE, ROUTINE: NORMAL
CULTURE, BLOOD 2: NORMAL

## 2022-08-06 NOTE — DISCHARGE SUMMARY
DISCHARGE SUMMARY  Patient ID:  Kassie Wright  [id number]  10 y.o.  2/13/1926    Admit date: 8/1/2022      Discharge date : 8/3/2022      Admission Diagnoses: Generalized weakness [R53.1]  MAZIN (acute kidney injury) ([facility] [address] [N17.9]  UTI (urinary tract infection) with pyuria [N39.0]    Discharge Diagnosis  Principal Problem:    UTI (urinary tract infection) with pyuria  Active Problems:    MAZIN (acute kidney injury) ([facility] [address]    Pulmonary hypertension (HCC)    CKD (chronic kidney disease) stage 3, GFR 30-59 ml/min ([facility] [address]    COVID-19    Aortic stenosis  Resolved Problems:    * No resolved hospital problems. *      Hospital Course:         CHIEF COMPLAINT: Weakness fatigue cough and short of breath     History of Present Illness: 55-year-old female patient of Dr. Nubia Walsh I am asked to admit and follow. I spoke with the patient's nephew yesterday, Dr. Eva [facility] urology who requested that I follow patient. I phoned daughter Alisa Rodríguez today at [phone] to obtain further information. History also obtained from patient as well as second daughter. Patient developed COVID symptoms 7/20 and tested positive with fever as high as 99 with some mild diarrhea nausea and cough. Patient reported to be sluggish at times. Cough was productive of yellow sputum. She saw PCP in [facility] who recommended monoclonal IV antibody infusion which she underwent on 7/21. Despite the above there was not much improvement. She presented to the ED with the above symptoms and reported to be \"moderately severe\". Patient has been vaccinated twice for COVID-19 did not receive booster dose. --In the ED leukocyte esterase in the urine was positive nitrite was negative with rare bacteria. --Procalcitonin 0.  5, CRP 0.6. Ferritin 152.   D-dimer less than 200.  -- History of atrial fibrillation, she has been on amiodarone and apixaban since with no recurrence  --Known aortic stenosis for which she follows with cardiology in [place]  --In the ED physicians assumed she had acute kidney injury due to presenting BUN of 21 of creatinine 1.2. I reviewed her old records from 4/5/2022 done through [facility] with a BUN of 17 and a creatinine of 1.21. Therefore, no acute kidney injury. --Chest x-ray in the ED was unremarkable. She then underwent CTA of the chest with following results--          FINDINGS:   Pulmonary Arteries: Pulmonary arteries are adequately opacified for   evaluation. No evidence of intraluminal filling defect to suggest pulmonary   embolism. Main pulmonary artery is dilated measuring 36 mm in diameter. Mediastinum: No evidence of mediastinal lymphadenopathy. The heart and   pericardium demonstrate no acute abnormality. Cardiomegaly is noted. Prominent aortic valve calcification is seen. There is no acute abnormality   of the thoracic aorta. Lungs/pleura: The lungs are without acute process. No focal consolidation or   pulmonary edema. No evidence of pleural effusion or pneumothorax. Upper Abdomen: Limited images of the upper abdomen are unremarkable. Soft Tissues/Bones: No acute bone or soft tissue abnormality. Impression:       No evidence of pulmonary embolism or acute pulmonary abnormality. Cardiomegaly with dilated main pulmonary artery suggestive of pulmonary   arterial hypertension      --On admission proBNP slightly elevated 943, today elevated 1363. Troponins have been borderline but no significant increase. . B12 greater than 2184 folic acid greater than 20. Creatinine from today 1.1.  --Patient underwent molecular/PCR viral respiratory panel which was positive for SARS-CoV-2 by PCR. Legionella and strep antigens are negative. ASO titer is negative. WBC at 7.9 today. 8/9/3082-DEOTSYDCNS: Cherelle Harley is alert awake and cooperative; oriented ×3. Denies any chest pain dyspnea nausea emesis. Tolerating diet. No abdominal pain.   Sitting in bedside chair eating her lunch hoping for discharge soon. Afebrile last 24 hours. SPO2 93 on room air. Blood pressure 132/69. Intake and output +1603 cc. Chloride 111 CO2 20 creatinine 1.2 BUN 21. Protein 5.7 magnesium 2.1. CRP pending. ESR 5. Blood cultures negative to date, urine culture pending. 2D echo from yesterday as follows--      Summary   Left ventricle grossly normal in size. Left ventricle grossly normal in size. Mild left ventricular concentric hypertrophy noted. Normal LV segmental wall motion. Estimated left ventricular ejection fraction is 67±5%. Diastolic function cannot be accurately assessed due to significant   valvular disease. The LAESV Index is >34 ml/m2. Mildly dilated right ventricle. TAPSE is normal   Mild mitral regurgitation is present. Decreased aortic valve leaflet excursion. The aortic valve appears severely sclerotic. Mild aortic regurgitation is noted. Severe aortic stenosis is present. The mean gradient across the aortic valve is calculated as 63 mmHg by   doppler. The aortic valve dimensionless index is 0.24 . Mild tricuspid regurgitation. RVSP is 44 mmHg. Technically good quality study. No comparison study available. Suggest clinical correlation. Awaiting outside cardiologist recent 2D echo. ID consult from yesterday appreciated. No need for treatment of SARS-CoV-2 but needs to remain in droplet isolation. Physical therapy AMPAC score from yesterday 19/24. ID note from today, on room air feels short of breath with walking longer distances. No COVID treatment indications at this time. Paramjit Safer for home. Cardiology agrees , home. Hospital orders:     PLAN:  Medications discussed with patient  GI prophylaxis  DVT prophylaxis  Consultants notes reviewed  2D echo has been ordered  Cardiology and ID have been consulted  COVID isolation  Ceftriaxone 1 g IV every 24 hours  Await urine culture results  Discontinue IV fluids in view of increasing proBNP  Albuterol 2 puffs 4 times daily  Cordarone 100 mg daily  Apixaban 2.5 mg twice daily  Furosemide 20 mg Monday and Thursday each week  Synthroid 50 mcg daily  Potassium chloride twice weekly Monday and Thursday  Obtain vitamin D level  Discontinue B12 in view of above normal results  Monitor appropriate labs  Sputum gram stain      Instructions at discharge:      RE  CHANGES AND FINDINGS  Medications reviewed with patient  GI prophylaxis  DVT prophylaxis  Consultants notes reviewed  ID has discontinued ceftriaxone IV  Await results of urine culture  Apixaban 2.5 mg daily  Amiodarone 100 mg daily  Furosemide 20 mg twice weekly  Potassium chloride 10 mEq twice weekly  Discharge home  Needs follow-up re: AORTIC STENOSIS    Condition at DISCHARGE : [address]78     Discharged to:  HOME    Discharge Instructions: Medications reviewed with patient    Consults:cardiology and ID     Past Medical Hx :   Past Medical History:   Diagnosis Date    Aortic stenosis 8/2/2022    CKD (chronic kidney disease) stage 3, GFR 30-59 ml/min ([facility] [address] 8/2/2022    COVID-19 8/2/2022    Pulmonary hypertension ([facility] [address] 8/2/2022    UTI (urinary tract infection) with pyuria 8/1/2022       Past Surgical Hx :History reviewed. No pertinent surgical history.     Labs:   CBC:   Lab Results   Component Value Date/Time    WBC 7.4 08/03/2022 04:40 AM    RBC 4.41 08/03/2022 04:40 AM    HGB 13.0 08/03/2022 04:40 AM    HCT 39.5 08/03/2022 04:40 AM    MCV 89.6 08/03/2022 04:40 AM    MCH 29.5 08/03/2022 04:40 AM    MCHC 32.9 08/03/2022 04:40 AM    RDW 13.3 08/03/2022 04:40 AM     08/03/2022 04:40 AM    MPV 9.3 08/03/2022 04:40 AM     CBC with Differential:    Lab Results   Component Value Date/Time    WBC 7.4 08/03/2022 04:40 AM    RBC 4.41 08/03/2022 04:40 AM    HGB 13.0 08/03/2022 04:40 AM    HCT 39.5 08/03/2022 04:40 AM     08/03/2022 04:40 AM    MCV 89.6 08/03/2022 04:40 AM    MCH 29.5 08/03/2022 04:40 AM    MCHC 32.9 08/03/2022 Phosphorus:    Lab Results   Component Value Date/Time    PHOS 3.4 08/03/2022 04:40 AM     LDH:  No results found for: LDH  Uric Acid:    Lab Results   Component Value Date/Time    LABURIC 4.5 08/02/2022 04:30 AM     PT/INR:  No results found for: PROTIME, INR  Warfarin PT/INR:  No components found for: PTPATWAR, PTINRWAR  PTT:  No results found for: APTT, PTT[APTT}  Troponin:  No results found for: TROPONINI  Last 3 Troponin:  No results found for: TROPONINI  U/A:    Lab Results   Component Value Date/Time    COLORU Yellow 08/01/2022 03:29 PM    PROTEINU Negative 08/01/2022 03:29 PM    PHUR 6.0 08/01/2022 03:29 PM    WBCUA 2-5 08/01/2022 03:29 PM    RBCUA NONE 08/01/2022 03:29 PM    BACTERIA RARE 08/01/2022 03:29 PM    CLARITYU Clear 08/01/2022 03:29 PM    SPECGRAV <=1.005 08/01/2022 03:29 PM    LEUKOCYTESUR MODERATE 08/01/2022 03:29 PM    UROBILINOGEN 0.2 08/01/2022 03:29 PM    BILIRUBINUR Negative 08/01/2022 03:29 PM    BLOODU Negative 08/01/2022 03:29 PM    GLUCOSEU Negative 08/01/2022 03:29 PM     ABG:  No results found for: PH, PCO2, PO2, HCO3, BE, THGB, TCO2, O2SAT  HgBA1c:    Lab Results   Component Value Date/Time    LABA1C 5.6 08/02/2022 04:30 AM     FLP:    Lab Results   Component Value Date/Time    TRIG 67 08/02/2022 04:30 AM    HDL 53 08/02/2022 04:30 AM    LDLCALC 111 08/02/2022 04:30 AM    LABVLDL 13 08/02/2022 04:30 AM     TSH:    Lab Results   Component Value Date/Time    TSH 1.350 08/02/2022 04:30 AM     VITAMIN B12: No components found for: B12  FOLATE:    Lab Results   Component Value Date/Time    FOLATE >20.0 08/02/2022 04:30 AM     IRON:    Lab Results   Component Value Date/Time    IRON 63 08/02/2022 04:30 AM     Iron Saturation:  No components found for: PERCENTFE  TIBC:    Lab Results   Component Value Date/Time    TIBC 197 08/02/2022 04:30 AM     FERRITIN:    Lab Results   Component Value Date/Time    FERRITIN 152 08/02/2022 04:30 AM          CBC:No results for input(s): WBC, RBC, HGB, HCT, PLT, MCV, MCH, MCHC, RDW, NRBC, SEGSPCT, BANDSPCT, BLASTSPCT, METASPCT, LYMPHOPCT, PROMYELOPCT, MONOPCT, MYELOPCT, EOSPCT, BASOPCT, MONOSABS, LYMPHSABS, EOSABS, BASOSABS, DIFFTYPE in the last 72 hours. Invalid input(s): ATYLMREL       H & H :No results for input(s): HGB in the last 72 hours. TSH:No results for input(s): TSH in the last 72 hours. GLUCOSE:No results for input(s): POCGLU in the last 72 hours. CMP:No results for input(s): NA, K, CL, CO2, BUN, CREATININE, GFRAA, AGRATIO, LABGLOM, GLUCOSE, GLU, PROT, LABALBU, CALCIUM, BILITOT, ALKPHOS, AST, ALT in the last 72 hours. BNP:No results found for: BNP    PROTIME/INR:No results for input(s): PROTIME, INR in the last 72 hours. CRP: No results for input(s): CRP in the last 72 hours. ESR:No results for input(s): SEDRATE in the last 72 hours. LIPASE , AMYLASE:  No results found for: LIPASE   No results found for: AMYLASE    ABGs: No results found for: PHART, PO2ART, LUE0BJK    CARDIAC: No results for input(s): CKTOTAL, CKMB, CKMBINDEX, TROPONINI in the last 72 hours.     Lipid Profile:   Lab Results   Component Value Date/Time    TRIG 67 08/02/2022 04:30 AM    HDL 53 08/02/2022 04:30 AM    LDLCALC 111 08/02/2022 04:30 AM    CHOL 177 08/02/2022 04:30 AM        Echo Complete    Result Date: 8/2/2022  Transthoracic Echocardiography Report (TTE)  Demographics   Patient Name       Ara Muñoz      Gender              Female                     [address] Record     [id number]      Room Number         0032  Number   Account #          [de-identified]     Procedure Date      08/02/2022   Corporate ID                     Ordering Physician  Fidel Davis   Accession Number   [id number]    Referring Physician [address] [place] [address]   Date of Birth      02/13/1926    Sonographer         [address]   Age                81 year(s)    Interpreting        Allie Sandoval DO                                   Physician                                    Any Other  Procedure Type of Study   TTE procedure:Echo Complete W/Doppler & Color Flow. Procedure Date Date: 08/02/2022 Start: 03:17 PM Study Location: Portable Technical Quality: Adequate visualization Indications:Congestive heart failure. Patient Status: Routine Height: 63 inches Weight: 169 pounds BSA: 1.8 m^2 BMI: 29.94 kg/m^2 HR: 60 bpm BP: 130/77 mmHg  Findings   Left Ventricle  Left ventricle grossly normal in size. Left ventricle grossly normal in size. Mild left ventricular concentric hypertrophy noted. Normal LV segmental wall motion. Estimated left ventricular ejection fraction is 67±5%. Diastolic function cannot be accurately assessed due to significant  valvular disease. Right Ventricle  Mildly dilated right ventricle. TAPSE is normal   Left Atrium  The left atrium is mildly dilated. The LAESV Index is >34 ml/m2. Interatrial septum appears intact. Right Atrium  Right atrium is normal in size. Mitral Valve  Mild-moderate mitral annular calcification. Mild mitral regurgitation is present. Tricuspid Valve  The tricuspid valve appears structurally normal.  Mild tricuspid regurgitation. RVSP is 44 mmHg. Aortic Valve  The aortic valve is trileaflet. Decreased aortic valve leaflet excursion. The aortic valve appears severely sclerotic. Mild aortic regurgitation is noted. Severe aortic stenosis is present. The mean gradient across the aortic valve is calculated as 63 mmHg by  doppler. The aortic valve dimensionless index is 0.24 . Pulmonic Valve  The pulmonic valve was not well visualized. Pericardial Effusion  No evidence of pericardial thickening/calcification present. No evidence of pericardial effusion. Aorta  Aortic root dimension within normal limits. Ascending aorta appears mildly sclerotic and/or calcified.   Miscellaneous  Normal Inferior Vena Cava diameter. Inferior Vena Cava, <50% respiratory variation. Conclusions   Summary  Left ventricle grossly normal in size. Left ventricle grossly normal in size. Mild left ventricular concentric hypertrophy noted. Normal LV segmental wall motion. Estimated left ventricular ejection fraction is 67±5%. Diastolic function cannot be accurately assessed due to significant  valvular disease. The LAESV Index is >34 ml/m2. Mildly dilated right ventricle. TAPSE is normal  Mild mitral regurgitation is present. Decreased aortic valve leaflet excursion. The aortic valve appears severely sclerotic. Mild aortic regurgitation is noted. Severe aortic stenosis is present. The mean gradient across the aortic valve is calculated as 63 mmHg by  doppler. The aortic valve dimensionless index is 0.24 . Mild tricuspid regurgitation. RVSP is 44 mmHg. Technically good quality study. No comparison study available. Suggest clinical correlation.    Signature   ----------------------------------------------------------------  Electronically signed by Clare Xavier DO(Interpreting  physician) on 08/02/2022 11:27 PM  ----------------------------------------------------------------  M-Mode/2D Measurements & Calculations   LV Diastolic     LV Systolic Dimension: 3 cm       LA Dimension: 4 cm  Dimension: 4.3   LV Volume Diastolic: 11.2 ml  cm               LV Volume Systolic: 09.0 ml  LV WC:30.7 %     LV EDV/LV EDV Index: 84.5 AD/09  LV PW Diastolic: DB/N^7GZ ESV/LV ESV Index: 65.8   RV Diastolic Dimension:  1.1 cm           ml/20ml/ m^2                      3.3 cm  LV PW Systolic:  EF Calculated: 58.5 %  1.4 cm           LV Mass Index: 96 l/min*m^2  Septum           LV Length: 7.1 cm                 LA volume/Index: 50.7  Diastolic: 1.2                                     ml /34.73ml/m^2  cm               LVOT: 1.8 cm                      RA Area: 19.5 cm^2  Septum Systolic:  1.2 cm IVC Expiration: 1.5 cm  CO: 4.35 l/min  CI: 2.42 l/m*m^2  LV Mass: 173.65  g  Doppler Measurements & Calculations   MV Peak E-Wave:     AV Peak Velocity: 4.76 LVOT Peak Velocity: 1.15 m/s  1.12 m/s            m/s                    LVOT Mean Velocity: 0.85 m/s  MV Peak A-Wave:     AV Peak Gradient:      LVOT Peak Gradient: 5.3  1.26 m/s            90.59 mmHg             mmHgLVOT Mean Gradient: 3.1  MV E/A Ratio: 0.89  AV Mean Velocity: 3.91 mmHg  MV Peak Gradient:   m/s                    Estimated RVSP: 39.2 mmHg  7.3 mmHg            AV Mean Gradient: 63.2 Estimated RAP:3 mmHg  MV Mean Gradient:   mmHg  4.1 mmHg            AV VTI: 118.7 cm  MV Mean Velocity:   AV Area                TR Velocity:3.01 m/s  0.98 m/s            (Continuity):0.61 cm^2 TR Gradient:36.24 mmHg  MV Deceleration     AV Deceleration Time:  PV Peak Velocity: 1.07 m/s  Time: 248.9 msec    1712.7 msec            PV Peak Gradient: 4.57 mmHg  MV P1/2t: 80.3 msec LVOT VTI: 28.5 cm      PV Mean Velocity: 0.77 m/s  MVA by PHT:2.74     IVRT: 101.5 msec       PV Mean Gradient: 2.6 mmHg  cm^2                Estimated PASP: 39.24  MV Area             mmHg  (continuity): 1.5  cm^2  MV E' Septal  Velocity: 0.05 m/s  MV E' Lateral  Velocity: 6 m/s  [email or web address] [id number]    XR CHEST (2 VW)    Result Date: 8/1/2022  EXAMINATION: TWO XRAY VIEWS OF THE CHEST 8/1/2022 2:58 pm COMPARISON: None. HISTORY: ORDERING SYSTEM PROVIDED HISTORY: cough TECHNOLOGIST PROVIDED HISTORY: Reason for exam:->cough FINDINGS: The lungs are well expanded. A small density left upper lung field may represent calcified granuloma, bone island within the left posterior 5th rib or confluence of shadows. Cardiac silhouette size is upper limits of normal. The aorta is mildly tortuous. There is moderate thoracic spondylosis. No pneumothorax.      No radiographic evidence of acute cardiopulmonary disease. CTA PULMONARY W CONTRAST    Result Date: 8/1/2022  EXAMINATION: CTA OF THE CHEST 8/1/2022 4:43 pm TECHNIQUE: CTA of the chest was performed after the administration of intravenous contrast.  Multiplanar reformatted images are provided for review. MIP images are provided for review. Automated exposure control, iterative reconstruction, and/or weight based adjustment of the mA/kV was utilized to reduce the radiation dose to as low as reasonably achievable. Contrast amount: 60 ml Isovue-370. Dose: Total Exam DLP: 169.14 mGy-cm. COMPARISON: Chest x-ray 08/01/2022 HISTORY: ORDERING SYSTEM PROVIDED HISTORY: r/o pe TECHNOLOGIST PROVIDED HISTORY: Reason for exam:->r/o pe Decision Support Exception - unselect if not a suspected or confirmed emergency medical condition->Emergency Medical Condition (MA) FINDINGS: Pulmonary Arteries: Pulmonary arteries are adequately opacified for evaluation. No evidence of intraluminal filling defect to suggest pulmonary embolism. Main pulmonary artery is dilated measuring 36 mm in diameter. Mediastinum: No evidence of mediastinal lymphadenopathy. The heart and pericardium demonstrate no acute abnormality. Cardiomegaly is noted. Prominent aortic valve calcification is seen. There is no acute abnormality of the thoracic aorta. Lungs/pleura: The lungs are without acute process. No focal consolidation or pulmonary edema. No evidence of pleural effusion or pneumothorax. Upper Abdomen: Limited images of the upper abdomen are unremarkable. Soft Tissues/Bones: No acute bone or soft tissue abnormality. No evidence of pulmonary embolism or acute pulmonary abnormality. Cardiomegaly with dilated main pulmonary artery suggestive of pulmonary arterial hypertension.        Discharge Exam:  See progress note from today    Discharge Medication List as of 8/3/2022  2:50 PM        START taking these medications    Details   albuterol sulfate HFA (PROVENTIL;VENTOLIN;PROAIR) 108 (90 Base) MCG/ACT inhaler Inhale 2 puffs into the lungs 4 times daily for 10 days, Disp-18 g, R-0Normal           CONTINUE these medications which have NOT CHANGED    Details   apixaban (ELIQUIS) 2.5 MG TABS tablet Take 2.5 mg by mouth in the morning and 2.5 mg before bedtime. Historical Med      levothyroxine (SYNTHROID) 50 MCG tablet Take 50 mcg by mouth in the morning. Historical Med      amiodarone (PACERONE) 100 MG tablet Take 100 mg by mouth in the morning. Historical Med      Multiple Vitamins-Minerals (OCUVITE ADULT 50+) CAPS Take 1 tablet by mouth dailyHistorical Med      vitamin B-12 (CYANOCOBALAMIN) 500 MCG tablet Take 500 mcg by mouth in the morning. Historical Med      Cholecalciferol (VITAMIN D3) 125 MCG (5000 UT) TABS Take 1 tablet by mouth in the morning. Historical Med      potassium chloride (MICRO-K) 10 MEQ extended release capsule Take 10 mEq by mouth Twice a Week Monday and Friday wt the lasixHistorical Med      furosemide (LASIX) 20 MG tablet Take 20 mg by mouth Twice a Week Monday and fridaysHistorical Med      Flaxseed, Linseed, (FLAXSEED OIL) 1200 MG CAPS Take 1 tablet by mouth dailyHistorical Med             Time Spent on discharge is more than 30 minutes --      TIME  INCLUDES TIME THAT WAS  SPENT WITH DISCHARGE PAPERS, MEDICATION REVIEW, MEDICATION RECONCILIATION,   PRESCRIPTIONS, CHART REVIEW, PATIENT EXAM , FINAL PROGRESS NOTE, DISCUSSION OF FINDINGS WITH PATIENT AND AVAILABLE FAMILY , AND DICTATION  WHERE NEEDED ; Home Health Care [facility]) FORMS COMPLETED ; N-17  COMPLETION ;  H&P UPDATED ; DURABLE EQUIPMENT FORMS.      Active Hospital Problems    Diagnosis     Pulmonary hypertension (HCC) [I27.20]      Priority: Medium    CKD (chronic kidney disease) stage 3, GFR 30-59 ml/min (HCC) [N18.30]      Priority: Medium    COVID-19 [U07.1]      Priority: Medium    Aortic stenosis [I35.0]      Priority: Medium    MAZIN (acute kidney injury) ([facility] [address] [N17.9]      Priority: Medium    UTI (urinary tract infection) with pyuria [N39.0]      Priority: Medium       Signed:    Danilo Whalen DO      8/6/2022    2:16 PM    Voice recognition software use for dictation

## 2022-08-10 ENCOUNTER — CARE COORDINATION (OUTPATIENT)
Dept: CASE MANAGEMENT | Age: 88
End: 2022-08-10

## 2022-08-10 NOTE — CARE COORDINATION
[facility]) COVID-19 Monitoring Care Transitions Follow Up Call        Patient: Srikanth Pump  Patient : 1926   MRN: [id number]  Reason for Admission: MAZIN  Discharge Date: 8/3/22 RARS: No data recorded     Patient contacted regarding COVID-19 diagnosis. Care Transition Nurse contacted the patient by telephone to perform follow-up assessment. Patient has following risk factors of: chronic kidney disease  Pulm htn, afib, aortic stenosis . Symptoms reviewed with patient who verbalized the following symptoms: fatigue  no new symptoms. Called and spoke with the pt for a sub COVID-19 monitoring call. Pt states that she has been doing \"good\" and only complaint voiced is feeling \"tired. \" Pt denies any cough, wheezing, or sob. Pt does have an Albuterol inhaler that she states that she uses about 1-2x daily. Denies any increased use of her inhaler. Pt states that she is eating and drinking well. Pt states that she has a scheduled f/u with her pcp in 2 weeks. Pt states that she has not yet called Dr. Brenda Borjas (card) to schedule a f/u with him yet. Pt voiced no needs/concerns at this time. She was agreeable to ongoing outreaches. Due to no new or worsening symptoms encounter was not routed to provider for escalation. CTN reviewed  medical action plan and red flag symptoms with the patient who verbalized understanding. Patient was given an opportunity to verbalize any questions and concerns and agrees to contact CTN or health care provider for questions related to their healthcare. Was patient discharged with a pulse oximeter? Pt has her own and has been checking at readings. Pt reports SaO2 levels ranging between 95-96% on RA    CTN provided contact information. Plan for follow-up call in 7-10 days based on severity of symptoms and risk factors.

## 2022-08-16 ENCOUNTER — CARE COORDINATION (OUTPATIENT)
Dept: CASE MANAGEMENT | Age: 88
End: 2022-08-16

## 2022-08-16 NOTE — CARE COORDINATION
[facility]) COVID-19 Monitoring Care Transitions Follow Up Call        Patient: John Gasca  Patient : 1926   MRN: [id number]  Reason for Admission: MAZIN  Discharge Date: 8/3/22 RARS: No data recorded           Attempted to reach the patient for a sub COVID Monitoring Care Transition call post hospital discharge. Message left with CTN's contact information requesting return phone call. Will attempt outreach again. Follow Up  No future appointments.     Emiliana Wei RN

## 2022-08-22 ENCOUNTER — CARE COORDINATION (OUTPATIENT)
Dept: CASE MANAGEMENT | Age: 88
End: 2022-08-22

## 2022-08-22 NOTE — CARE COORDINATION
[facility] Transitions Follow Up Call        Patient: Mariela Mei  Patient : 1926   MRN: [id number]  Reason for Admission: MAZIN  Discharge Date: 8/3/22 RARS: No data recorded         2nd attempt to reach the patient for a sub COVID Monitoring Care Transition call post hospital discharge. Message left with CTN's contact information requesting return phone call. No further outreaches will be attempted. If no return call by the end of today, CTN will  sign off and resolve COVID-19 Monitoring episode         Follow Up  No future appointments.     Ammon Zhu RN

## 2023-06-04 PROBLEM — A41.9 SEPSIS (HCC): Status: ACTIVE | Noted: 2023-06-04

## 2023-06-04 PROBLEM — I48.0 PAF (PAROXYSMAL ATRIAL FIBRILLATION) (HCC): Status: ACTIVE | Noted: 2023-06-04

## 2023-06-05 PROBLEM — I50.33 ACUTE ON CHRONIC HEART FAILURE WITH PRESERVED EJECTION FRACTION (HCC): Status: ACTIVE | Noted: 2023-06-05

## 2023-06-06 PROBLEM — I31.2 HEMOPERICARDIUM: Status: ACTIVE | Noted: 2022-10-01

## 2023-06-06 PROBLEM — Z98.890 HISTORY OF OPEN HEART SURGERY: Status: ACTIVE | Noted: 2022-10-01

## 2023-06-06 PROBLEM — E61.1 IRON DEFICIENCY: Status: ACTIVE | Noted: 2023-06-06

## 2023-06-06 PROBLEM — Z95.2 HISTORY OF TRANSCATHETER AORTIC VALVE REPLACEMENT (TAVR): Status: ACTIVE | Noted: 2022-10-10

## 2023-06-06 PROBLEM — Z95.0 PACEMAKER: Status: ACTIVE | Noted: 2022-10-01

## 2023-06-11 PROBLEM — J95.811 POSTPROCEDURAL PNEUMOTHORAX: Status: ACTIVE | Noted: 2023-06-11

## 2025-05-31 ENCOUNTER — APPOINTMENT (OUTPATIENT)
Dept: GENERAL RADIOLOGY | Age: 89
DRG: 571 | End: 2025-05-31
Payer: MEDICARE

## 2025-05-31 ENCOUNTER — HOSPITAL ENCOUNTER (INPATIENT)
Age: 89
LOS: 9 days | Discharge: NURSING, REHAB OR LONG-TERM CARE | DRG: 571 | End: 2025-06-09
Attending: STUDENT IN AN ORGANIZED HEALTH CARE EDUCATION/TRAINING PROGRAM | Admitting: INTERNAL MEDICINE
Payer: MEDICARE

## 2025-05-31 ENCOUNTER — APPOINTMENT (OUTPATIENT)
Dept: ULTRASOUND IMAGING | Age: 89
DRG: 571 | End: 2025-05-31
Payer: MEDICARE

## 2025-05-31 DIAGNOSIS — L03.115 CELLULITIS OF RIGHT LEG: Primary | ICD-10-CM

## 2025-05-31 DIAGNOSIS — N17.9 AKI (ACUTE KIDNEY INJURY): ICD-10-CM

## 2025-05-31 DIAGNOSIS — S80.11XA HEMATOMA OF RIGHT LOWER LEG: ICD-10-CM

## 2025-05-31 LAB
ALBUMIN SERPL-MCNC: 3.5 G/DL (ref 3.5–5.2)
ALP SERPL-CCNC: 137 U/L (ref 35–104)
ALT SERPL-CCNC: 19 U/L (ref 0–35)
ANION GAP SERPL CALCULATED.3IONS-SCNC: 12 MMOL/L (ref 7–16)
AST SERPL-CCNC: 56 U/L (ref 0–35)
BASOPHILS # BLD: 0.09 K/UL (ref 0–0.2)
BASOPHILS NFR BLD: 1 % (ref 0–2)
BILIRUB SERPL-MCNC: 0.4 MG/DL (ref 0–1.2)
BUN SERPL-MCNC: 29 MG/DL (ref 8–23)
CALCIUM SERPL-MCNC: 9 MG/DL (ref 8.8–10.2)
CHLORIDE SERPL-SCNC: 103 MMOL/L (ref 98–107)
CO2 SERPL-SCNC: 22 MMOL/L (ref 22–29)
CREAT SERPL-MCNC: 1.6 MG/DL (ref 0.5–1)
CRP SERPL HS-MCNC: 48.6 MG/L (ref 0–5)
EOSINOPHIL # BLD: 0.29 K/UL (ref 0.05–0.5)
EOSINOPHILS RELATIVE PERCENT: 3 % (ref 0–6)
ERYTHROCYTE [DISTWIDTH] IN BLOOD BY AUTOMATED COUNT: 14 % (ref 11.5–15)
ERYTHROCYTE [SEDIMENTATION RATE] IN BLOOD BY WESTERGREN METHOD: 24 MM/HR (ref 0–20)
GFR, ESTIMATED: 29 ML/MIN/1.73M2
GLUCOSE SERPL-MCNC: 117 MG/DL (ref 74–99)
HCT VFR BLD AUTO: 41.1 % (ref 34–48)
HGB BLD-MCNC: 13.4 G/DL (ref 11.5–15.5)
IMM GRANULOCYTES # BLD AUTO: 0.06 K/UL (ref 0–0.58)
IMM GRANULOCYTES NFR BLD: 1 % (ref 0–5)
INR PPP: 3.6
LACTATE BLDV-SCNC: 1.5 MMOL/L (ref 0.5–1.9)
LYMPHOCYTES NFR BLD: 2.48 K/UL (ref 1.5–4)
LYMPHOCYTES RELATIVE PERCENT: 26 % (ref 20–42)
MCH RBC QN AUTO: 28.8 PG (ref 26–35)
MCHC RBC AUTO-ENTMCNC: 32.6 G/DL (ref 32–34.5)
MCV RBC AUTO: 88.2 FL (ref 80–99.9)
MONOCYTES NFR BLD: 1.3 K/UL (ref 0.1–0.95)
MONOCYTES NFR BLD: 14 % (ref 2–12)
NEUTROPHILS NFR BLD: 56 % (ref 43–80)
NEUTS SEG NFR BLD: 5.43 K/UL (ref 1.8–7.3)
PARTIAL THROMBOPLASTIN TIME: 52.5 SEC (ref 24.5–35.1)
PLATELET # BLD AUTO: 354 K/UL (ref 130–450)
PMV BLD AUTO: 9 FL (ref 7–12)
POTASSIUM SERPL-SCNC: 4 MMOL/L (ref 3.5–5.1)
PROT SERPL-MCNC: 6.8 G/DL (ref 6.4–8.3)
PROTHROMBIN TIME: 39.5 SEC (ref 9.3–12.4)
RBC # BLD AUTO: 4.66 M/UL (ref 3.5–5.5)
SODIUM SERPL-SCNC: 136 MMOL/L (ref 136–145)
WBC OTHER # BLD: 9.7 K/UL (ref 4.5–11.5)

## 2025-05-31 PROCEDURE — 85025 COMPLETE CBC W/AUTO DIFF WBC: CPT

## 2025-05-31 PROCEDURE — 73590 X-RAY EXAM OF LOWER LEG: CPT

## 2025-05-31 PROCEDURE — 80053 COMPREHEN METABOLIC PANEL: CPT

## 2025-05-31 PROCEDURE — 6360000002 HC RX W HCPCS: Performed by: STUDENT IN AN ORGANIZED HEALTH CARE EDUCATION/TRAINING PROGRAM

## 2025-05-31 PROCEDURE — 85610 PROTHROMBIN TIME: CPT

## 2025-05-31 PROCEDURE — 86140 C-REACTIVE PROTEIN: CPT

## 2025-05-31 PROCEDURE — 1200000000 HC SEMI PRIVATE

## 2025-05-31 PROCEDURE — 6360000002 HC RX W HCPCS

## 2025-05-31 PROCEDURE — 2500000003 HC RX 250 WO HCPCS

## 2025-05-31 PROCEDURE — 93971 EXTREMITY STUDY: CPT

## 2025-05-31 PROCEDURE — 96374 THER/PROPH/DIAG INJ IV PUSH: CPT

## 2025-05-31 PROCEDURE — 99285 EMERGENCY DEPT VISIT HI MDM: CPT

## 2025-05-31 PROCEDURE — 83605 ASSAY OF LACTIC ACID: CPT

## 2025-05-31 PROCEDURE — 85652 RBC SED RATE AUTOMATED: CPT

## 2025-05-31 PROCEDURE — 87040 BLOOD CULTURE FOR BACTERIA: CPT

## 2025-05-31 PROCEDURE — 85730 THROMBOPLASTIN TIME PARTIAL: CPT

## 2025-05-31 PROCEDURE — 2580000003 HC RX 258

## 2025-05-31 PROCEDURE — 96375 TX/PRO/DX INJ NEW DRUG ADDON: CPT

## 2025-05-31 RX ORDER — 0.9 % SODIUM CHLORIDE 0.9 %
1000 INTRAVENOUS SOLUTION INTRAVENOUS ONCE
Status: COMPLETED | OUTPATIENT
Start: 2025-05-31 | End: 2025-06-01

## 2025-05-31 RX ORDER — ENOXAPARIN SODIUM 300 MG/3ML
80 INJECTION INTRAVENOUS; SUBCUTANEOUS
Status: ON HOLD | COMMUNITY
End: 2025-06-05 | Stop reason: HOSPADM

## 2025-05-31 RX ORDER — MORPHINE SULFATE 2 MG/ML
2 INJECTION, SOLUTION INTRAMUSCULAR; INTRAVENOUS ONCE
Refills: 0 | Status: COMPLETED | OUTPATIENT
Start: 2025-05-31 | End: 2025-05-31

## 2025-05-31 RX ADMIN — SODIUM CHLORIDE 1000 ML: 9 INJECTION, SOLUTION INTRAVENOUS at 23:28

## 2025-05-31 RX ADMIN — WATER 2000 MG: 1 INJECTION INTRAMUSCULAR; INTRAVENOUS; SUBCUTANEOUS at 21:16

## 2025-05-31 RX ADMIN — MORPHINE SULFATE 2 MG: 2 INJECTION, SOLUTION INTRAMUSCULAR; INTRAVENOUS at 21:17

## 2025-05-31 ASSESSMENT — PAIN - FUNCTIONAL ASSESSMENT
PAIN_FUNCTIONAL_ASSESSMENT: 0-10
PAIN_FUNCTIONAL_ASSESSMENT: PREVENTS OR INTERFERES SOME ACTIVE ACTIVITIES AND ADLS

## 2025-05-31 ASSESSMENT — PAIN DESCRIPTION - ORIENTATION: ORIENTATION: RIGHT

## 2025-05-31 ASSESSMENT — PAIN SCALES - GENERAL
PAINLEVEL_OUTOF10: 7
PAINLEVEL_OUTOF10: 10

## 2025-05-31 ASSESSMENT — PAIN DESCRIPTION - FREQUENCY: FREQUENCY: CONTINUOUS

## 2025-05-31 ASSESSMENT — PAIN DESCRIPTION - PAIN TYPE: TYPE: ACUTE PAIN

## 2025-05-31 ASSESSMENT — PAIN DESCRIPTION - DESCRIPTORS: DESCRIPTORS: ACHING;PRESSURE;TENDER

## 2025-05-31 ASSESSMENT — PAIN DESCRIPTION - ONSET: ONSET: PROGRESSIVE

## 2025-05-31 ASSESSMENT — PAIN DESCRIPTION - LOCATION
LOCATION: LEG
LOCATION: LEG

## 2025-06-01 ENCOUNTER — APPOINTMENT (OUTPATIENT)
Dept: CT IMAGING | Age: 89
DRG: 571 | End: 2025-06-01
Attending: INTERNAL MEDICINE
Payer: MEDICARE

## 2025-06-01 LAB
ASO AB SERPL-ACNC: <20 IU/ML (ref 0–200)
INR PPP: 5.1
INR PPP: 5.2
PROTHROMBIN TIME: 56.3 SEC (ref 9.3–12.4)
PROTHROMBIN TIME: 57.6 SEC (ref 9.3–12.4)

## 2025-06-01 PROCEDURE — 2500000003 HC RX 250 WO HCPCS: Performed by: INTERNAL MEDICINE

## 2025-06-01 PROCEDURE — 2580000003 HC RX 258: Performed by: INTERNAL MEDICINE

## 2025-06-01 PROCEDURE — 6360000002 HC RX W HCPCS: Performed by: INTERNAL MEDICINE

## 2025-06-01 PROCEDURE — 74176 CT ABD & PELVIS W/O CONTRAST: CPT

## 2025-06-01 PROCEDURE — 86063 ANTISTREPTOLYSIN O SCREEN: CPT

## 2025-06-01 PROCEDURE — 6360000002 HC RX W HCPCS

## 2025-06-01 PROCEDURE — 85610 PROTHROMBIN TIME: CPT

## 2025-06-01 PROCEDURE — 6370000000 HC RX 637 (ALT 250 FOR IP): Performed by: INTERNAL MEDICINE

## 2025-06-01 PROCEDURE — 99223 1ST HOSP IP/OBS HIGH 75: CPT | Performed by: SURGERY

## 2025-06-01 PROCEDURE — 36415 COLL VENOUS BLD VENIPUNCTURE: CPT

## 2025-06-01 PROCEDURE — 1200000000 HC SEMI PRIVATE

## 2025-06-01 RX ORDER — SODIUM CHLORIDE 9 MG/ML
INJECTION, SOLUTION INTRAVENOUS CONTINUOUS
Status: ACTIVE | OUTPATIENT
Start: 2025-06-01 | End: 2025-06-02

## 2025-06-01 RX ORDER — LANOLIN ALCOHOL/MO/W.PET/CERES
500 CREAM (GRAM) TOPICAL DAILY
Status: DISCONTINUED | OUTPATIENT
Start: 2025-06-01 | End: 2025-06-09 | Stop reason: HOSPADM

## 2025-06-01 RX ORDER — VITS A,C,E/LUTEIN/MINERALS 300MCG-200
1 TABLET ORAL DAILY
Status: DISCONTINUED | OUTPATIENT
Start: 2025-06-01 | End: 2025-06-09 | Stop reason: HOSPADM

## 2025-06-01 RX ORDER — HYDROCODONE BITARTRATE AND ACETAMINOPHEN 5; 325 MG/1; MG/1
1 TABLET ORAL EVERY 6 HOURS PRN
Status: DISCONTINUED | OUTPATIENT
Start: 2025-06-01 | End: 2025-06-04

## 2025-06-01 RX ORDER — AMIODARONE HYDROCHLORIDE 200 MG/1
50 TABLET ORAL DAILY
Status: DISCONTINUED | OUTPATIENT
Start: 2025-06-01 | End: 2025-06-09 | Stop reason: HOSPADM

## 2025-06-01 RX ORDER — DOCUSATE SODIUM 100 MG/1
100 CAPSULE, LIQUID FILLED ORAL 2 TIMES DAILY
Status: DISCONTINUED | OUTPATIENT
Start: 2025-06-01 | End: 2025-06-09 | Stop reason: HOSPADM

## 2025-06-01 RX ORDER — MORPHINE SULFATE 2 MG/ML
2 INJECTION, SOLUTION INTRAMUSCULAR; INTRAVENOUS ONCE
Status: COMPLETED | OUTPATIENT
Start: 2025-06-01 | End: 2025-06-01

## 2025-06-01 RX ORDER — LEVOTHYROXINE SODIUM 50 UG/1
50 TABLET ORAL DAILY
Status: DISCONTINUED | OUTPATIENT
Start: 2025-06-01 | End: 2025-06-09 | Stop reason: HOSPADM

## 2025-06-01 RX ORDER — FUROSEMIDE 20 MG/1
20 TABLET ORAL
Status: DISCONTINUED | OUTPATIENT
Start: 2025-06-02 | End: 2025-06-01

## 2025-06-01 RX ORDER — ASPIRIN 81 MG/1
81 TABLET ORAL DAILY
Status: DISCONTINUED | OUTPATIENT
Start: 2025-06-01 | End: 2025-06-09 | Stop reason: HOSPADM

## 2025-06-01 RX ADMIN — WATER 2000 MG: 1 INJECTION INTRAMUSCULAR; INTRAVENOUS; SUBCUTANEOUS at 04:00

## 2025-06-01 RX ADMIN — Medication 1 TABLET: at 09:16

## 2025-06-01 RX ADMIN — Medication 5000 UNITS: at 09:17

## 2025-06-01 RX ADMIN — DOCUSATE SODIUM 100 MG: 100 CAPSULE, LIQUID FILLED ORAL at 20:56

## 2025-06-01 RX ADMIN — ASPIRIN 81 MG: 81 TABLET, COATED ORAL at 09:16

## 2025-06-01 RX ADMIN — DOCUSATE SODIUM 100 MG: 100 CAPSULE, LIQUID FILLED ORAL at 09:16

## 2025-06-01 RX ADMIN — MORPHINE SULFATE 2 MG: 2 INJECTION, SOLUTION INTRAMUSCULAR; INTRAVENOUS at 04:01

## 2025-06-01 RX ADMIN — WATER 2000 MG: 1 INJECTION INTRAMUSCULAR; INTRAVENOUS; SUBCUTANEOUS at 20:56

## 2025-06-01 RX ADMIN — HYDROCODONE BITARTRATE AND ACETAMINOPHEN 1 TABLET: 5; 325 TABLET ORAL at 22:49

## 2025-06-01 RX ADMIN — AMIODARONE HYDROCHLORIDE 50 MG: 200 TABLET ORAL at 09:15

## 2025-06-01 RX ADMIN — CYANOCOBALAMIN TAB 1000 MCG 500 MCG: 1000 TAB at 09:17

## 2025-06-01 RX ADMIN — LEVOTHYROXINE SODIUM 50 MCG: 0.05 TABLET ORAL at 06:39

## 2025-06-01 RX ADMIN — SODIUM CHLORIDE 950 ML: 9 INJECTION, SOLUTION INTRAVENOUS at 11:27

## 2025-06-01 RX ADMIN — WATER 2000 MG: 1 INJECTION INTRAMUSCULAR; INTRAVENOUS; SUBCUTANEOUS at 12:44

## 2025-06-01 ASSESSMENT — PAIN DESCRIPTION - ORIENTATION
ORIENTATION: RIGHT
ORIENTATION: RIGHT

## 2025-06-01 ASSESSMENT — PAIN DESCRIPTION - LOCATION
LOCATION: LEG
LOCATION: LEG

## 2025-06-01 ASSESSMENT — PAIN - FUNCTIONAL ASSESSMENT
PAIN_FUNCTIONAL_ASSESSMENT: PREVENTS OR INTERFERES SOME ACTIVE ACTIVITIES AND ADLS
PAIN_FUNCTIONAL_ASSESSMENT: 0-10
PAIN_FUNCTIONAL_ASSESSMENT: 0-10

## 2025-06-01 ASSESSMENT — PAIN DESCRIPTION - DESCRIPTORS
DESCRIPTORS: JABBING;DISCOMFORT;SHARP
DESCRIPTORS: ACHING;DISCOMFORT;THROBBING

## 2025-06-01 ASSESSMENT — LIFESTYLE VARIABLES
HOW OFTEN DO YOU HAVE A DRINK CONTAINING ALCOHOL: NEVER
HOW MANY STANDARD DRINKS CONTAINING ALCOHOL DO YOU HAVE ON A TYPICAL DAY: PATIENT DOES NOT DRINK

## 2025-06-01 ASSESSMENT — PAIN SCALES - GENERAL
PAINLEVEL_OUTOF10: 9
PAINLEVEL_OUTOF10: 10

## 2025-06-01 NOTE — ED PROVIDER NOTES
[facility] EMERGENCY DEPARTMENT  EMERGENCY DEPARTMENT ENCOUNTER        Pt Name: Sil Rascon  MRN: [id number]  Birthdate 2/13/1926  Date of evaluation: 5/31/2025  Provider: Katerina Pro MD  PCP: Gene Flores DO  Note Started: 8:49 PM EDT 5/31/25    CHIEF COMPLAINT       Chief Complaint   Patient presents with    Wound Check     Sent in to be admitted. Has DVT in right leg, on thinner. Now has developed cellulitis. Denies fever, has drainage to leg    Leg Pain       HISTORY OF PRESENT ILLNESS: 1 or more Elements   History From: Patient and family member    Limitations to history : None    Sil Rascon is a 89 y.o. female who presents for right redness, swelling, pain to right lower extremity.  Patient had a DVT diagnosed 2 weeks ago.  She was placed on Eliquis.  Today there was redness noted.  Patient has had wound/hematoma to the posterior aspect of the right calf however there was color change and it became black in color today.  There was concern that the patient has failed Eliquis and she is being transitioned with Lovenox to warfarin.  Denies fevers, chest pain, shortness of breath, abdominal pain, other associated symptoms.    Nursing Notes were all reviewed and agreed with or any disagreements were addressed in the HPI.        REVIEW OF EXTERNAL NOTE :       Oh patient was seen by cardiology in office on 10/6/2023 status post TAVR.  TAVR was done on 10/10/2022    REVIEW OF SYSTEMS :           Positives and Pertinent negatives as per HPI.     SURGICAL HISTORY     Past Surgical History:   Procedure Laterality Date    AORTIC VALVE SURGERY  10/10/2022    TAVR done at [facility]    CARDIAC CATHETERIZATION  09/27/2022    [facility]; no significant CAD    PACEMAKER INSERTION  10/2022    Postoperative    STERNOTOMY  10/2022    Care in right ventricle post pacemaker insertion; hemopericardium       CURRENTMEDICATIONS       Previous Medications

## 2025-06-01 NOTE — ED NOTES
Blood accumulated around injection site on RLQ of abdomen from Lovenox administered at home. Physicians made aware, advised to place dermabond and cover.

## 2025-06-01 NOTE — ED NOTES
Pt spo2 sustaining 86% on room air while pt is sleeping. Pt placed on 2L NC and spo2 increased to 93%.

## 2025-06-01 NOTE — H&P
Passive exposure: Never    Smokeless tobacco: Never   Vaping Use    Vaping status: Never Used   Substance and Sexual Activity    Alcohol use: Never    Drug use: Never    Sexual activity: Not on file   Other Topics Concern    Not on file   Social History Narrative    Not on file     Social Drivers of Health     Financial Resource Strain: Low Risk (10/20/2022)    Received from [facility] ([facility]    Financial Resource Strain     Sometimes people find that their income does not quite cover their living costs.  In the last 12 months, has this happened to you?: No     What is your current work situation? : Unemployed, and not seeking work (ex: student, retired, disabled, unpaid primary care giver)   Food Insecurity: No Food Insecurity (6/1/2025)    Hunger Vital Sign     Worried About Running Out of Food in the Last Year: Never true     Ran Out of Food in the Last Year: Never true   Transportation Needs: No Transportation Needs (6/1/2025)    PRAPARE - Transportation     Lack of Transportation (Medical): No     Lack of Transportation (Non-Medical): No   Physical Activity: Not on file   Stress: Not on file   Social Connections: Not on file   Intimate Partner Violence: Not on file   Housing Stability: Low Risk  (6/1/2025)    Housing Stability Vital Sign     Unable to Pay for Housing in the Last Year: No     Number of Times Moved in the Last Year: 0     Homeless in the Last Year: No         Family History:       Problem Relation Age of Onset    COPD Maternal Grandfather        REVIEW OF SYSTEMS:    General ROS: negative  Hematological and Lymphatic ROS: negative  Endocrine ROS: negative  Respiratory ROS: no cough,  wheezing  or shortness of breath,   Cardiovascular ROS: no chest pain or dyspnea on exertion  Gastrointestinal ROS: no abdominal pain, change in bowel habits, or black or bloody stools  Genito-Urinary ROS: no dysuria, trouble voiding, or hematuria  Neurological ROS: no TIA or stroke

## 2025-06-02 LAB
INR PPP: 5.5
PROTHROMBIN TIME: 61.4 SEC (ref 9.3–12.4)

## 2025-06-02 PROCEDURE — 85610 PROTHROMBIN TIME: CPT

## 2025-06-02 PROCEDURE — 36415 COLL VENOUS BLD VENIPUNCTURE: CPT

## 2025-06-02 PROCEDURE — 2580000003 HC RX 258: Performed by: INTERNAL MEDICINE

## 2025-06-02 PROCEDURE — 2500000003 HC RX 250 WO HCPCS: Performed by: INTERNAL MEDICINE

## 2025-06-02 PROCEDURE — 1200000000 HC SEMI PRIVATE

## 2025-06-02 PROCEDURE — 6370000000 HC RX 637 (ALT 250 FOR IP): Performed by: INTERNAL MEDICINE

## 2025-06-02 PROCEDURE — 6360000002 HC RX W HCPCS: Performed by: INTERNAL MEDICINE

## 2025-06-02 RX ORDER — PHYTONADIONE 5 MG/1
5 TABLET ORAL ONCE
Status: COMPLETED | OUTPATIENT
Start: 2025-06-02 | End: 2025-06-02

## 2025-06-02 RX ADMIN — Medication 1 TABLET: at 09:07

## 2025-06-02 RX ADMIN — DOCUSATE SODIUM 100 MG: 100 CAPSULE, LIQUID FILLED ORAL at 09:04

## 2025-06-02 RX ADMIN — LEVOTHYROXINE SODIUM 50 MCG: 0.05 TABLET ORAL at 05:37

## 2025-06-02 RX ADMIN — WATER 2000 MG: 1 INJECTION INTRAMUSCULAR; INTRAVENOUS; SUBCUTANEOUS at 05:01

## 2025-06-02 RX ADMIN — HYDROCODONE BITARTRATE AND ACETAMINOPHEN 1 TABLET: 5; 325 TABLET ORAL at 05:38

## 2025-06-02 RX ADMIN — Medication 5000 UNITS: at 09:05

## 2025-06-02 RX ADMIN — SODIUM CHLORIDE: 9 INJECTION, SOLUTION INTRAVENOUS at 04:15

## 2025-06-02 RX ADMIN — HYDROCODONE BITARTRATE AND ACETAMINOPHEN 1 TABLET: 5; 325 TABLET ORAL at 11:52

## 2025-06-02 RX ADMIN — PHYTONADIONE 5 MG: 5 TABLET ORAL at 14:16

## 2025-06-02 RX ADMIN — DOCUSATE SODIUM 100 MG: 100 CAPSULE, LIQUID FILLED ORAL at 21:33

## 2025-06-02 RX ADMIN — WATER 2000 MG: 1 INJECTION INTRAMUSCULAR; INTRAVENOUS; SUBCUTANEOUS at 11:54

## 2025-06-02 RX ADMIN — CYANOCOBALAMIN TAB 1000 MCG 500 MCG: 1000 TAB at 09:04

## 2025-06-02 RX ADMIN — AMIODARONE HYDROCHLORIDE 50 MG: 200 TABLET ORAL at 09:05

## 2025-06-02 RX ADMIN — HYDROCODONE BITARTRATE AND ACETAMINOPHEN 1 TABLET: 5; 325 TABLET ORAL at 22:35

## 2025-06-02 ASSESSMENT — PAIN - FUNCTIONAL ASSESSMENT
PAIN_FUNCTIONAL_ASSESSMENT: PREVENTS OR INTERFERES SOME ACTIVE ACTIVITIES AND ADLS
PAIN_FUNCTIONAL_ASSESSMENT: ACTIVITIES ARE NOT PREVENTED
PAIN_FUNCTIONAL_ASSESSMENT: PREVENTS OR INTERFERES SOME ACTIVE ACTIVITIES AND ADLS
PAIN_FUNCTIONAL_ASSESSMENT: 0-10
PAIN_FUNCTIONAL_ASSESSMENT: 0-10

## 2025-06-02 ASSESSMENT — PAIN DESCRIPTION - LOCATION
LOCATION: LEG

## 2025-06-02 ASSESSMENT — PAIN DESCRIPTION - PAIN TYPE
TYPE: ACUTE PAIN
TYPE: ACUTE PAIN

## 2025-06-02 ASSESSMENT — PAIN SCALES - GENERAL
PAINLEVEL_OUTOF10: 10
PAINLEVEL_OUTOF10: 2
PAINLEVEL_OUTOF10: 10
PAINLEVEL_OUTOF10: 0
PAINLEVEL_OUTOF10: 6
PAINLEVEL_OUTOF10: 0

## 2025-06-02 ASSESSMENT — PAIN DESCRIPTION - ORIENTATION
ORIENTATION: RIGHT

## 2025-06-02 ASSESSMENT — PAIN DESCRIPTION - DESCRIPTORS
DESCRIPTORS: ACHING;DISCOMFORT;STABBING
DESCRIPTORS: ACHING;STABBING;DISCOMFORT
DESCRIPTORS: ACHING;STABBING;TIGHTNESS

## 2025-06-02 ASSESSMENT — PAIN DESCRIPTION - ONSET
ONSET: PROGRESSIVE
ONSET: PROGRESSIVE

## 2025-06-02 ASSESSMENT — PAIN DESCRIPTION - FREQUENCY
FREQUENCY: CONTINUOUS
FREQUENCY: CONTINUOUS

## 2025-06-02 NOTE — CARE COORDINATION
6/2/2025social work transition of care planning  Sw spoke with pt's dtr Milady via phone. Pt is from home alone. Pt reportedly has walker,s/c and bsc at home. Pt pcp is Dr Gene Flores and pharmacy [place] [facility] on [address] [place]. Pt has hx of snf at [facility] and Hx of c-unable to remember provider. Explained sw role in transition of care planning. Family is unsure of plan until she talks to a physician. Sw left zack list in room.  The Plan for Transition of Care is related to the following treatment goals: possible snf    The Patient and/or patient representative  was provided with a choice of provider and agrees   with the discharge plan. [x] Yes [] No    Freedom of choice list was provided with basic dialogue that supports the patient's individualized plan of care/goals, treatment preferences and shares the quality data associated with the providers. [x] Yes [] No  Electronically signed by SANDRINE Orellana on 6/2/2025 at 9:58 AM

## 2025-06-02 NOTE — CARE COORDINATION
6/2/2025social work transition of care planning  Sw followed up with pt's dtr,Barb at bedside,has not reviewed snf list. Therapy to work with pt,once medically stable.  Electronically signed by SANDRINE Orellana on 6/2/2025 at 2:38 PM

## 2025-06-03 ENCOUNTER — PREP FOR PROCEDURE (OUTPATIENT)
Dept: VASCULAR SURGERY | Age: 89
End: 2025-06-03

## 2025-06-03 LAB
BASOPHILS # BLD: 0.06 K/UL (ref 0–0.2)
BASOPHILS NFR BLD: 1 % (ref 0–2)
EOSINOPHIL # BLD: 0.2 K/UL (ref 0.05–0.5)
EOSINOPHILS RELATIVE PERCENT: 2 % (ref 0–6)
ERYTHROCYTE [DISTWIDTH] IN BLOOD BY AUTOMATED COUNT: 14.2 % (ref 11.5–15)
HCT VFR BLD AUTO: 33.3 % (ref 34–48)
HGB BLD-MCNC: 10.7 G/DL (ref 11.5–15.5)
IMM GRANULOCYTES # BLD AUTO: 0.09 K/UL (ref 0–0.58)
IMM GRANULOCYTES NFR BLD: 1 % (ref 0–5)
INR PPP: 1.7
INR PPP: 1.9
LYMPHOCYTES NFR BLD: 1.72 K/UL (ref 1.5–4)
LYMPHOCYTES RELATIVE PERCENT: 16 % (ref 20–42)
MCH RBC QN AUTO: 28.8 PG (ref 26–35)
MCHC RBC AUTO-ENTMCNC: 32.1 G/DL (ref 32–34.5)
MCV RBC AUTO: 89.5 FL (ref 80–99.9)
MONOCYTES NFR BLD: 1.42 K/UL (ref 0.1–0.95)
MONOCYTES NFR BLD: 13 % (ref 2–12)
NEUTROPHILS NFR BLD: 68 % (ref 43–80)
NEUTS SEG NFR BLD: 7.32 K/UL (ref 1.8–7.3)
PLATELET # BLD AUTO: 323 K/UL (ref 130–450)
PMV BLD AUTO: 8.9 FL (ref 7–12)
PROTHROMBIN TIME: 18.3 SEC (ref 9.3–12.4)
PROTHROMBIN TIME: 20.6 SEC (ref 9.3–12.4)
RBC # BLD AUTO: 3.72 M/UL (ref 3.5–5.5)
WBC OTHER # BLD: 10.8 K/UL (ref 4.5–11.5)

## 2025-06-03 PROCEDURE — 97530 THERAPEUTIC ACTIVITIES: CPT

## 2025-06-03 PROCEDURE — 6370000000 HC RX 637 (ALT 250 FOR IP): Performed by: INTERNAL MEDICINE

## 2025-06-03 PROCEDURE — 1200000000 HC SEMI PRIVATE

## 2025-06-03 PROCEDURE — 36415 COLL VENOUS BLD VENIPUNCTURE: CPT

## 2025-06-03 PROCEDURE — 6360000002 HC RX W HCPCS: Performed by: INTERNAL MEDICINE

## 2025-06-03 PROCEDURE — 97165 OT EVAL LOW COMPLEX 30 MIN: CPT

## 2025-06-03 PROCEDURE — 2500000003 HC RX 250 WO HCPCS: Performed by: INTERNAL MEDICINE

## 2025-06-03 PROCEDURE — 85610 PROTHROMBIN TIME: CPT

## 2025-06-03 PROCEDURE — 85025 COMPLETE CBC W/AUTO DIFF WBC: CPT

## 2025-06-03 PROCEDURE — 97161 PT EVAL LOW COMPLEX 20 MIN: CPT

## 2025-06-03 RX ADMIN — Medication 1 TABLET: at 09:25

## 2025-06-03 RX ADMIN — LEVOTHYROXINE SODIUM 50 MCG: 0.05 TABLET ORAL at 06:27

## 2025-06-03 RX ADMIN — HYDROCODONE BITARTRATE AND ACETAMINOPHEN 1 TABLET: 5; 325 TABLET ORAL at 19:17

## 2025-06-03 RX ADMIN — AMIODARONE HYDROCHLORIDE 50 MG: 200 TABLET ORAL at 11:36

## 2025-06-03 RX ADMIN — APIXABAN 2.5 MG: 2.5 TABLET, FILM COATED ORAL at 12:53

## 2025-06-03 RX ADMIN — DOCUSATE SODIUM 100 MG: 100 CAPSULE, LIQUID FILLED ORAL at 09:31

## 2025-06-03 RX ADMIN — WATER 2000 MG: 1 INJECTION INTRAMUSCULAR; INTRAVENOUS; SUBCUTANEOUS at 12:57

## 2025-06-03 RX ADMIN — CYANOCOBALAMIN TAB 1000 MCG 500 MCG: 1000 TAB at 09:25

## 2025-06-03 RX ADMIN — HYDROCODONE BITARTRATE AND ACETAMINOPHEN 1 TABLET: 5; 325 TABLET ORAL at 12:53

## 2025-06-03 RX ADMIN — WATER 2000 MG: 1 INJECTION INTRAMUSCULAR; INTRAVENOUS; SUBCUTANEOUS at 00:41

## 2025-06-03 RX ADMIN — Medication 5000 UNITS: at 09:25

## 2025-06-03 RX ADMIN — HYDROCODONE BITARTRATE AND ACETAMINOPHEN 1 TABLET: 5; 325 TABLET ORAL at 06:27

## 2025-06-03 RX ADMIN — DOCUSATE SODIUM 100 MG: 100 CAPSULE, LIQUID FILLED ORAL at 21:55

## 2025-06-03 ASSESSMENT — PAIN SCALES - GENERAL
PAINLEVEL_OUTOF10: 2
PAINLEVEL_OUTOF10: 4
PAINLEVEL_OUTOF10: 7
PAINLEVEL_OUTOF10: 4
PAINLEVEL_OUTOF10: 7
PAINLEVEL_OUTOF10: 8

## 2025-06-03 ASSESSMENT — PAIN DESCRIPTION - ORIENTATION
ORIENTATION: RIGHT
ORIENTATION: RIGHT

## 2025-06-03 ASSESSMENT — PAIN DESCRIPTION - DESCRIPTORS
DESCRIPTORS: ACHING;STABBING;DISCOMFORT
DESCRIPTORS: ACHING;DISCOMFORT;DULL

## 2025-06-03 ASSESSMENT — PAIN DESCRIPTION - ONSET
ONSET: PROGRESSIVE
ONSET: ON-GOING

## 2025-06-03 ASSESSMENT — PAIN DESCRIPTION - PAIN TYPE
TYPE: ACUTE PAIN
TYPE: ACUTE PAIN

## 2025-06-03 ASSESSMENT — PAIN - FUNCTIONAL ASSESSMENT
PAIN_FUNCTIONAL_ASSESSMENT: ACTIVITIES ARE NOT PREVENTED
PAIN_FUNCTIONAL_ASSESSMENT: PREVENTS OR INTERFERES SOME ACTIVE ACTIVITIES AND ADLS

## 2025-06-03 ASSESSMENT — PAIN DESCRIPTION - LOCATION
LOCATION: LEG
LOCATION: LEG

## 2025-06-03 ASSESSMENT — PAIN DESCRIPTION - FREQUENCY
FREQUENCY: CONTINUOUS
FREQUENCY: CONTINUOUS

## 2025-06-03 NOTE — DISCHARGE INSTR - COC
therapy.  Ventilator:    - No ventilator support    Rehab Therapies: Physical Therapy and Occupational Therapy  Weight Bearing Status/Restrictions: No weight bearing restrictions  Other Medical Equipment (for information only, NOT a DME order):  wheelchair  Other Treatments: ***    Patient's personal belongings (please select all that are sent with patient):  Glasses, Hearing Aides bilateral    RN SIGNATURE:  Electronically signed by Sana Salcido RN on 6/9/25 at 9:07 AM EDT    CASE MANAGEMENT/SOCIAL WORK SECTION    Inpatient Status Date: ***    Readmission Risk Assessment Score:  [facility] RISK OF UNPLANNED READMISSION 2.0             13.7 Total Score        Discharging to Facility/ Agency   Name: [facility]  Address:  Phone:  Fax:    Dialysis Facility (if applicable)   Name:  Address:  Dialysis Schedule:  Phone:  Fax:    / signature: Electronically signed by SANDRINE Orellana on 6/3/25 at 12:01 PM EDT    PHYSICIAN SECTION    Prognosis: {Prognosis:2513911097}    Condition at Discharge: { Patient Condition:600459318}    Rehab Potential (if transferring to Rehab): {Prognosis:1910036834}    Recommended Labs or Other Treatments After Discharge: ***    Physician Certification: I certify the above information and transfer of Sil Rascon  is necessary for the continuing treatment of the diagnosis listed and that she requires Skilled Nursing Facility for less 30 days.     Update Admission H&P: No change in H&P    PHYSICIAN SIGNATURE:  Electronically signed by Alejandro Amor MD on 6/6/25 at 1:45 PM EDT

## 2025-06-03 NOTE — CARE COORDINATION
6/3/2025social work transition of care planning  Pt plan is pending therapy eval and recs. Sw left zack list for pt and family  to review and provide choices.  Electronically signed by SANDRINE Orellana on 6/3/2025 at 8:36 AM    Addendum: Kath received call from Milady with zack choices:1) [facility], [facility] and 3) [facility]. Kath will send referral via care port,once therapy evals in [facility].  Electronically signed by SANDRINE Orellana on 6/3/2025 at 8:53 AM    Addendum: Kath notified that [facility] accepted pt. Kath will request precert be started once info obtained.  Electronically signed by SANDRINE Orellana on 6/3/2025 at 12:03 PM

## 2025-06-04 ENCOUNTER — APPOINTMENT (OUTPATIENT)
Dept: GENERAL RADIOLOGY | Age: 89
DRG: 571 | End: 2025-06-04
Payer: MEDICARE

## 2025-06-04 ENCOUNTER — ANESTHESIA EVENT (OUTPATIENT)
Dept: OPERATING ROOM | Age: 89
End: 2025-06-04
Payer: MEDICARE

## 2025-06-04 ENCOUNTER — ANESTHESIA (OUTPATIENT)
Dept: OPERATING ROOM | Age: 89
End: 2025-06-04
Payer: MEDICARE

## 2025-06-04 LAB
ABO + RH BLD: NORMAL
ARM BAND NUMBER: NORMAL
BLOOD BANK SAMPLE EXPIRATION: NORMAL
BLOOD GROUP ANTIBODIES SERPL: NEGATIVE

## 2025-06-04 PROCEDURE — 3600000014 HC SURGERY LEVEL 4 ADDTL 15MIN: Performed by: SURGERY

## 2025-06-04 PROCEDURE — 6370000000 HC RX 637 (ALT 250 FOR IP): Performed by: SURGERY

## 2025-06-04 PROCEDURE — 0JCN0ZZ EXTIRPATION OF MATTER FROM RIGHT LOWER LEG SUBCUTANEOUS TISSUE AND FASCIA, OPEN APPROACH: ICD-10-PCS | Performed by: SURGERY

## 2025-06-04 PROCEDURE — 3700000001 HC ADD 15 MINUTES (ANESTHESIA): Performed by: SURGERY

## 2025-06-04 PROCEDURE — 0JBN0ZZ EXCISION OF RIGHT LOWER LEG SUBCUTANEOUS TISSUE AND FASCIA, OPEN APPROACH: ICD-10-PCS | Performed by: SURGERY

## 2025-06-04 PROCEDURE — 6370000000 HC RX 637 (ALT 250 FOR IP)

## 2025-06-04 PROCEDURE — 36415 COLL VENOUS BLD VENIPUNCTURE: CPT

## 2025-06-04 PROCEDURE — 6360000002 HC RX W HCPCS: Performed by: INTERNAL MEDICINE

## 2025-06-04 PROCEDURE — 87070 CULTURE OTHR SPECIMN AEROBIC: CPT

## 2025-06-04 PROCEDURE — 3700000000 HC ANESTHESIA ATTENDED CARE: Performed by: SURGERY

## 2025-06-04 PROCEDURE — 6360000002 HC RX W HCPCS: Performed by: NURSE ANESTHETIST, CERTIFIED REGISTERED

## 2025-06-04 PROCEDURE — 11045 DBRDMT SUBQ TISS EACH ADDL: CPT | Performed by: SURGERY

## 2025-06-04 PROCEDURE — 11042 DBRDMT SUBQ TIS 1ST 20SQCM/<: CPT | Performed by: SURGERY

## 2025-06-04 PROCEDURE — 86900 BLOOD TYPING SEROLOGIC ABO: CPT

## 2025-06-04 PROCEDURE — 71045 X-RAY EXAM CHEST 1 VIEW: CPT

## 2025-06-04 PROCEDURE — 7100000001 HC PACU RECOVERY - ADDTL 15 MIN: Performed by: SURGERY

## 2025-06-04 PROCEDURE — 97530 THERAPEUTIC ACTIVITIES: CPT

## 2025-06-04 PROCEDURE — 6370000000 HC RX 637 (ALT 250 FOR IP): Performed by: INTERNAL MEDICINE

## 2025-06-04 PROCEDURE — 1200000000 HC SEMI PRIVATE

## 2025-06-04 PROCEDURE — 87075 CULTR BACTERIA EXCEPT BLOOD: CPT

## 2025-06-04 PROCEDURE — 3600000004 HC SURGERY LEVEL 4 BASE: Performed by: SURGERY

## 2025-06-04 PROCEDURE — 87205 SMEAR GRAM STAIN: CPT

## 2025-06-04 PROCEDURE — 97535 SELF CARE MNGMENT TRAINING: CPT

## 2025-06-04 PROCEDURE — 86850 RBC ANTIBODY SCREEN: CPT

## 2025-06-04 PROCEDURE — 6360000002 HC RX W HCPCS

## 2025-06-04 PROCEDURE — 93005 ELECTROCARDIOGRAM TRACING: CPT | Performed by: INTERNAL MEDICINE

## 2025-06-04 PROCEDURE — 2709999900 HC NON-CHARGEABLE SUPPLY: Performed by: SURGERY

## 2025-06-04 PROCEDURE — 86901 BLOOD TYPING SEROLOGIC RH(D): CPT

## 2025-06-04 PROCEDURE — 7100000000 HC PACU RECOVERY - FIRST 15 MIN: Performed by: SURGERY

## 2025-06-04 PROCEDURE — 2580000003 HC RX 258: Performed by: NURSE ANESTHETIST, CERTIFIED REGISTERED

## 2025-06-04 PROCEDURE — 2500000003 HC RX 250 WO HCPCS: Performed by: INTERNAL MEDICINE

## 2025-06-04 RX ORDER — ACETAMINOPHEN 325 MG/1
650 TABLET ORAL EVERY 6 HOURS
Status: DISCONTINUED | OUTPATIENT
Start: 2025-06-04 | End: 2025-06-09 | Stop reason: HOSPADM

## 2025-06-04 RX ORDER — SODIUM CHLORIDE 9 MG/ML
INJECTION, SOLUTION INTRAVENOUS
Status: DISCONTINUED | OUTPATIENT
Start: 2025-06-04 | End: 2025-06-04 | Stop reason: SDUPTHER

## 2025-06-04 RX ORDER — OXYCODONE HYDROCHLORIDE 5 MG/1
2.5 TABLET ORAL EVERY 4 HOURS PRN
Status: DISCONTINUED | OUTPATIENT
Start: 2025-06-04 | End: 2025-06-09 | Stop reason: HOSPADM

## 2025-06-04 RX ORDER — FENTANYL CITRATE 50 UG/ML
INJECTION, SOLUTION INTRAMUSCULAR; INTRAVENOUS
Status: DISCONTINUED | OUTPATIENT
Start: 2025-06-04 | End: 2025-06-04 | Stop reason: SDUPTHER

## 2025-06-04 RX ORDER — OXYCODONE HYDROCHLORIDE 5 MG/1
5 TABLET ORAL EVERY 4 HOURS PRN
Status: DISCONTINUED | OUTPATIENT
Start: 2025-06-04 | End: 2025-06-09 | Stop reason: HOSPADM

## 2025-06-04 RX ORDER — PHENYLEPHRINE HCL IN 0.9% NACL 1 MG/10 ML
SYRINGE (ML) INTRAVENOUS
Status: DISCONTINUED | OUTPATIENT
Start: 2025-06-04 | End: 2025-06-04 | Stop reason: SDUPTHER

## 2025-06-04 RX ORDER — PROPOFOL 10 MG/ML
INJECTION, EMULSION INTRAVENOUS
Status: DISCONTINUED | OUTPATIENT
Start: 2025-06-04 | End: 2025-06-04 | Stop reason: SDUPTHER

## 2025-06-04 RX ORDER — HYDROMORPHONE HYDROCHLORIDE 1 MG/ML
0.25 INJECTION, SOLUTION INTRAMUSCULAR; INTRAVENOUS; SUBCUTANEOUS
Status: DISCONTINUED | OUTPATIENT
Start: 2025-06-04 | End: 2025-06-09 | Stop reason: HOSPADM

## 2025-06-04 RX ORDER — LIDOCAINE HYDROCHLORIDE 20 MG/ML
JELLY TOPICAL PRN
Status: DISCONTINUED | OUTPATIENT
Start: 2025-06-04 | End: 2025-06-04 | Stop reason: ALTCHOICE

## 2025-06-04 RX ADMIN — Medication 100 MCG: at 13:58

## 2025-06-04 RX ADMIN — PROPOFOL 30 MCG/KG/MIN: 10 INJECTION, EMULSION INTRAVENOUS at 13:49

## 2025-06-04 RX ADMIN — FENTANYL CITRATE 20 MCG: 50 INJECTION, SOLUTION INTRAMUSCULAR; INTRAVENOUS at 13:55

## 2025-06-04 RX ADMIN — FENTANYL CITRATE 20 MCG: 50 INJECTION, SOLUTION INTRAMUSCULAR; INTRAVENOUS at 13:48

## 2025-06-04 RX ADMIN — DOCUSATE SODIUM 100 MG: 100 CAPSULE, LIQUID FILLED ORAL at 20:50

## 2025-06-04 RX ADMIN — PROPOFOL 30 MG: 10 INJECTION, EMULSION INTRAVENOUS at 13:48

## 2025-06-04 RX ADMIN — WATER 2000 MG: 1 INJECTION INTRAMUSCULAR; INTRAVENOUS; SUBCUTANEOUS at 12:27

## 2025-06-04 RX ADMIN — Medication 5000 UNITS: at 09:10

## 2025-06-04 RX ADMIN — CYANOCOBALAMIN TAB 1000 MCG 500 MCG: 1000 TAB at 09:10

## 2025-06-04 RX ADMIN — HYDROMORPHONE HYDROCHLORIDE 0.25 MG: 1 INJECTION, SOLUTION INTRAMUSCULAR; INTRAVENOUS; SUBCUTANEOUS at 20:48

## 2025-06-04 RX ADMIN — AMIODARONE HYDROCHLORIDE 50 MG: 200 TABLET ORAL at 09:14

## 2025-06-04 RX ADMIN — SODIUM CHLORIDE: 9 INJECTION, SOLUTION INTRAVENOUS at 13:40

## 2025-06-04 RX ADMIN — DOCUSATE SODIUM 100 MG: 100 CAPSULE, LIQUID FILLED ORAL at 09:13

## 2025-06-04 RX ADMIN — WATER 2000 MG: 1 INJECTION INTRAMUSCULAR; INTRAVENOUS; SUBCUTANEOUS at 00:40

## 2025-06-04 RX ADMIN — ACETAMINOPHEN 650 MG: 325 TABLET ORAL at 20:50

## 2025-06-04 RX ADMIN — Medication 1 TABLET: at 09:10

## 2025-06-04 ASSESSMENT — PAIN - FUNCTIONAL ASSESSMENT: PAIN_FUNCTIONAL_ASSESSMENT: ACTIVITIES ARE NOT PREVENTED

## 2025-06-04 ASSESSMENT — PAIN DESCRIPTION - DESCRIPTORS: DESCRIPTORS: STABBING;THROBBING;PRESSURE

## 2025-06-04 ASSESSMENT — PAIN SCALES - GENERAL
PAINLEVEL_OUTOF10: 9
PAINLEVEL_OUTOF10: 3
PAINLEVEL_OUTOF10: 6

## 2025-06-04 ASSESSMENT — PAIN DESCRIPTION - ORIENTATION: ORIENTATION: RIGHT

## 2025-06-04 ASSESSMENT — PAIN DESCRIPTION - LOCATION: LOCATION: ANKLE

## 2025-06-04 NOTE — ANESTHESIA POSTPROCEDURE EVALUATION
Department of Anesthesiology  Postprocedure Note    Patient: Sil Rascon  MRN: [id number]  YOB: 1926  Date of evaluation: 6/4/2025    Procedure Summary       Date: 06/04/25 Room / Location: [address]    Anesthesia Start: 1340 Anesthesia Stop: 1420    Procedure: debridement right lower extremity with evacuation of hematoma (Right: Leg Lower) Diagnosis:       Cellulitis of right leg      (Cellulitis of right leg [L03.115])    Surgeons: Wallace Denise MD Responsible Provider: Cheyenne Jaime MD    Anesthesia Type: MAC ASA Status: 4            Anesthesia Type: No value filed.    Cat Phase I: Cat Score: 9    Cat Phase II:      Anesthesia Post Evaluation    Patient location during evaluation: PACU  Patient participation: complete - patient participated  Level of consciousness: awake and alert  Airway patency: patent  Nausea & Vomiting: no nausea and no vomiting  Cardiovascular status: blood pressure returned to baseline and hemodynamically stable  Respiratory status: acceptable and spontaneous ventilation  Hydration status: euvolemic  Multimodal analgesia pain management approach  Pain management: adequate    No notable events documented.   No

## 2025-06-04 NOTE — CARE COORDINATION
6/4/2025social work transition of care planning  Pt plan is to [facility],once medically stable. Pt Will need precert. Pt for I&D today,per vascular note.  Electronically signed by SANDRINE Orellana on 6/4/2025 at 7:50 AM    Addendum: Kath notified that auth approved good thru 6/10. Awaits pt medical stability. Envelope in soft chart.  Electronically signed by SANDRINE Orellana on 6/4/2025 at 11:44 AM

## 2025-06-04 NOTE — OP NOTE
the procedure and was transferred to the recovery area in satisfactory condition.     Electronically signed by Wallace Denise MD on 6/4/2025 at 2:08 PM

## 2025-06-04 NOTE — ANESTHESIA PRE PROCEDURE
Department of Anesthesiology  Preprocedure Note       Name:  Sil Rascon   Age:  89 y.o.  :  1926                                          MRN:  [id number]         Date:  2025      Surgeon: Surgeon(s):  Wallace Denise MD    Procedure: Procedure(s):  debridement right lower extremity with evacuation of hematoma/ after 12:30    Medications prior to admission:   Prior to Admission medications    Medication Sig Start Date End Date Taking? Authorizing Provider   Potassium (POTASSIMIN PO) Take 10 mg by mouth daily   Yes Abida Casillas MD   WARFARIN SODIUM PO Take 8 mg by mouth daily   Yes Abida Casillas MD   enoxaparin (LOVENOX) 300 MG/3ML injection Inject 0.8 mLs into the skin 2 Inj on Tues, Wed and Thurs   Yes Abida Casillas MD   furosemide (LASIX) 20 MG tablet Take 1 tablet by mouth three times a week  Patient taking differently: Take 1 tablet by mouth daily 23  Yes Enrique Reddy DO   aspirin 81 MG EC tablet Take 1 tablet by mouth daily   Yes Abida Casillas MD   docusate sodium (COLACE) 100 MG capsule Take 1 capsule by mouth 2 times daily Family unsure of dose.   Yes Abida Casillas MD   levothyroxine (SYNTHROID) 50 MCG tablet Take 1 tablet by mouth Daily   Yes Abida Casillas MD   amiodarone (PACERONE) 100 MG tablet Take 0.5 tablets by mouth daily   Yes Abida Casillas MD   Multiple Vitamins-Minerals (OCUVITE ADULT 50+) CAPS Take 1 capsule by mouth daily   Yes Abida Casillas MD   vitamin B-12 (CYANOCOBALAMIN) 500 MCG tablet Take 1 tablet by mouth daily   Yes Abida Casillas MD   Cholecalciferol (VITAMIN D3) 125 MCG (5000 UT) TABS Take 1 tablet by mouth daily   Yes Abida Casillas MD   metOLazone (ZAROXOLYN) 2.5 MG tablet Take 1 tablet by mouth twice a week Monday and Thursday 30 minutes prior to BUMEX.    Abida Casillas MD   albuterol sulfate HFA (PROVENTIL;VENTOLIN;PROAIR) 108 (90 Base) MCG/ACT inhaler Inhale 2

## 2025-06-05 LAB
ANION GAP SERPL CALCULATED.3IONS-SCNC: 9 MMOL/L (ref 7–16)
BUN SERPL-MCNC: 30 MG/DL (ref 8–23)
CALCIUM SERPL-MCNC: 8.9 MG/DL (ref 8.8–10.2)
CHLORIDE SERPL-SCNC: 106 MMOL/L (ref 98–107)
CO2 SERPL-SCNC: 21 MMOL/L (ref 22–29)
CREAT SERPL-MCNC: 1.1 MG/DL (ref 0.5–1)
EKG ATRIAL RATE: 67 BPM
EKG P AXIS: 80 DEGREES
EKG P-R INTERVAL: 384 MS
EKG Q-T INTERVAL: 420 MS
EKG QRS DURATION: 128 MS
EKG QTC CALCULATION (BAZETT): 443 MS
EKG R AXIS: -30 DEGREES
EKG T AXIS: 116 DEGREES
EKG VENTRICULAR RATE: 67 BPM
ERYTHROCYTE [DISTWIDTH] IN BLOOD BY AUTOMATED COUNT: 14.3 % (ref 11.5–15)
GFR, ESTIMATED: 44 ML/MIN/1.73M2
GLUCOSE SERPL-MCNC: 138 MG/DL (ref 74–99)
HCT VFR BLD AUTO: 35.4 % (ref 34–48)
HGB BLD-MCNC: 11.3 G/DL (ref 11.5–15.5)
MCH RBC QN AUTO: 28.9 PG (ref 26–35)
MCHC RBC AUTO-ENTMCNC: 31.9 G/DL (ref 32–34.5)
MCV RBC AUTO: 90.5 FL (ref 80–99.9)
MICROORGANISM SPEC CULT: NORMAL
MICROORGANISM SPEC CULT: NORMAL
PLATELET # BLD AUTO: 352 K/UL (ref 130–450)
PMV BLD AUTO: 9.3 FL (ref 7–12)
POTASSIUM SERPL-SCNC: 4 MMOL/L (ref 3.5–5.1)
RBC # BLD AUTO: 3.91 M/UL (ref 3.5–5.5)
SERVICE CMNT-IMP: NORMAL
SERVICE CMNT-IMP: NORMAL
SODIUM SERPL-SCNC: 136 MMOL/L (ref 136–145)
SPECIMEN DESCRIPTION: NORMAL
SPECIMEN DESCRIPTION: NORMAL
WBC OTHER # BLD: 10 K/UL (ref 4.5–11.5)

## 2025-06-05 PROCEDURE — 1200000000 HC SEMI PRIVATE

## 2025-06-05 PROCEDURE — 6360000002 HC RX W HCPCS

## 2025-06-05 PROCEDURE — 97530 THERAPEUTIC ACTIVITIES: CPT

## 2025-06-05 PROCEDURE — 80048 BASIC METABOLIC PNL TOTAL CA: CPT

## 2025-06-05 PROCEDURE — 6370000000 HC RX 637 (ALT 250 FOR IP)

## 2025-06-05 PROCEDURE — 97535 SELF CARE MNGMENT TRAINING: CPT

## 2025-06-05 PROCEDURE — 2500000003 HC RX 250 WO HCPCS

## 2025-06-05 PROCEDURE — 36415 COLL VENOUS BLD VENIPUNCTURE: CPT

## 2025-06-05 PROCEDURE — 85027 COMPLETE CBC AUTOMATED: CPT

## 2025-06-05 RX ORDER — OXYCODONE HYDROCHLORIDE 5 MG/1
5 TABLET ORAL EVERY 4 HOURS PRN
Qty: 18 TABLET | Refills: 0 | Status: SHIPPED | OUTPATIENT
Start: 2025-06-05 | End: 2025-06-08

## 2025-06-05 RX ADMIN — OXYCODONE 2.5 MG: 5 TABLET ORAL at 10:19

## 2025-06-05 RX ADMIN — DOCUSATE SODIUM 100 MG: 100 CAPSULE, LIQUID FILLED ORAL at 21:13

## 2025-06-05 RX ADMIN — HYDROMORPHONE HYDROCHLORIDE 0.25 MG: 1 INJECTION, SOLUTION INTRAMUSCULAR; INTRAVENOUS; SUBCUTANEOUS at 11:27

## 2025-06-05 RX ADMIN — WATER 2000 MG: 1 INJECTION INTRAMUSCULAR; INTRAVENOUS; SUBCUTANEOUS at 23:40

## 2025-06-05 RX ADMIN — Medication 5000 UNITS: at 08:32

## 2025-06-05 RX ADMIN — DOCUSATE SODIUM 100 MG: 100 CAPSULE, LIQUID FILLED ORAL at 08:32

## 2025-06-05 RX ADMIN — CYANOCOBALAMIN TAB 1000 MCG 500 MCG: 1000 TAB at 08:32

## 2025-06-05 RX ADMIN — AMIODARONE HYDROCHLORIDE 50 MG: 200 TABLET ORAL at 08:32

## 2025-06-05 RX ADMIN — HYDROMORPHONE HYDROCHLORIDE 0.25 MG: 1 INJECTION, SOLUTION INTRAMUSCULAR; INTRAVENOUS; SUBCUTANEOUS at 02:37

## 2025-06-05 RX ADMIN — WATER 2000 MG: 1 INJECTION INTRAMUSCULAR; INTRAVENOUS; SUBCUTANEOUS at 00:22

## 2025-06-05 RX ADMIN — Medication 1 TABLET: at 08:32

## 2025-06-05 RX ADMIN — ACETAMINOPHEN 650 MG: 325 TABLET ORAL at 21:13

## 2025-06-05 RX ADMIN — LEVOTHYROXINE SODIUM 50 MCG: 0.05 TABLET ORAL at 05:51

## 2025-06-05 RX ADMIN — HYDROMORPHONE HYDROCHLORIDE 0.25 MG: 1 INJECTION, SOLUTION INTRAMUSCULAR; INTRAVENOUS; SUBCUTANEOUS at 19:03

## 2025-06-05 RX ADMIN — WATER 2000 MG: 1 INJECTION INTRAMUSCULAR; INTRAVENOUS; SUBCUTANEOUS at 11:27

## 2025-06-05 RX ADMIN — ACETAMINOPHEN 650 MG: 325 TABLET ORAL at 02:37

## 2025-06-05 RX ADMIN — ACETAMINOPHEN 650 MG: 325 TABLET ORAL at 10:20

## 2025-06-05 ASSESSMENT — PAIN - FUNCTIONAL ASSESSMENT
PAIN_FUNCTIONAL_ASSESSMENT: PREVENTS OR INTERFERES SOME ACTIVE ACTIVITIES AND ADLS
PAIN_FUNCTIONAL_ASSESSMENT: ACTIVITIES ARE NOT PREVENTED
PAIN_FUNCTIONAL_ASSESSMENT: PREVENTS OR INTERFERES SOME ACTIVE ACTIVITIES AND ADLS
PAIN_FUNCTIONAL_ASSESSMENT: ACTIVITIES ARE NOT PREVENTED
PAIN_FUNCTIONAL_ASSESSMENT: PREVENTS OR INTERFERES SOME ACTIVE ACTIVITIES AND ADLS

## 2025-06-05 ASSESSMENT — PAIN DESCRIPTION - FREQUENCY
FREQUENCY: INTERMITTENT

## 2025-06-05 ASSESSMENT — PAIN DESCRIPTION - LOCATION
LOCATION: LEG
LOCATION: LEG
LOCATION: ANKLE
LOCATION: LEG
LOCATION: LEG
LOCATION: ANKLE
LOCATION: LEG
LOCATION: LEG

## 2025-06-05 ASSESSMENT — PAIN DESCRIPTION - DESCRIPTORS
DESCRIPTORS: ACHING;DISCOMFORT;SORE;THROBBING
DESCRIPTORS: SORE
DESCRIPTORS: ACHING;SORE;DISCOMFORT
DESCRIPTORS: ACHING;DISCOMFORT;SORE;SHOOTING;SHARP
DESCRIPTORS: ACHING;DISCOMFORT;SORE;SHOOTING;SHARP
DESCRIPTORS: ACHING;DISCOMFORT;SORE;THROBBING
DESCRIPTORS: THROBBING;SORE;DISCOMFORT
DESCRIPTORS: ACHING;DISCOMFORT;SORE;SHOOTING

## 2025-06-05 ASSESSMENT — PAIN DESCRIPTION - ONSET
ONSET: GRADUAL
ONSET: PROGRESSIVE

## 2025-06-05 ASSESSMENT — PAIN SCALES - GENERAL
PAINLEVEL_OUTOF10: 7
PAINLEVEL_OUTOF10: 10
PAINLEVEL_OUTOF10: 6
PAINLEVEL_OUTOF10: 8
PAINLEVEL_OUTOF10: 5
PAINLEVEL_OUTOF10: 2
PAINLEVEL_OUTOF10: 3
PAINLEVEL_OUTOF10: 3
PAINLEVEL_OUTOF10: 8
PAINLEVEL_OUTOF10: 6

## 2025-06-05 ASSESSMENT — PAIN DESCRIPTION - PAIN TYPE
TYPE: ACUTE PAIN;SURGICAL PAIN
TYPE: ACUTE PAIN
TYPE: ACUTE PAIN;SURGICAL PAIN

## 2025-06-05 ASSESSMENT — PAIN DESCRIPTION - ORIENTATION
ORIENTATION: RIGHT
ORIENTATION: RIGHT
ORIENTATION: RIGHT;POSTERIOR
ORIENTATION: RIGHT
ORIENTATION: RIGHT;POSTERIOR
ORIENTATION: RIGHT

## 2025-06-05 NOTE — CARE COORDINATION
6/5/2025social work transition of care planning  Pt plan is to [facility] once medically stable. Auth has been obtained. Envelope in soft chart. Auth is good til 6/10.  Electronically signed by SANDRINE Orellana on 6/5/2025 at 7:36 AM

## 2025-06-06 ENCOUNTER — APPOINTMENT (OUTPATIENT)
Dept: GENERAL RADIOLOGY | Age: 89
DRG: 571 | End: 2025-06-06
Payer: MEDICARE

## 2025-06-06 LAB
ANION GAP SERPL CALCULATED.3IONS-SCNC: 10 MMOL/L (ref 7–16)
BUN SERPL-MCNC: 34 MG/DL (ref 8–23)
CALCIUM SERPL-MCNC: 8.8 MG/DL (ref 8.8–10.2)
CHLORIDE SERPL-SCNC: 104 MMOL/L (ref 98–107)
CO2 SERPL-SCNC: 22 MMOL/L (ref 22–29)
CREAT SERPL-MCNC: 1.1 MG/DL (ref 0.5–1)
ERYTHROCYTE [DISTWIDTH] IN BLOOD BY AUTOMATED COUNT: 14.3 % (ref 11.5–15)
GFR, ESTIMATED: 44 ML/MIN/1.73M2
GLUCOSE SERPL-MCNC: 100 MG/DL (ref 74–99)
HCT VFR BLD AUTO: 33.6 % (ref 34–48)
HGB BLD-MCNC: 10.9 G/DL (ref 11.5–15.5)
MCH RBC QN AUTO: 28.8 PG (ref 26–35)
MCHC RBC AUTO-ENTMCNC: 32.4 G/DL (ref 32–34.5)
MCV RBC AUTO: 88.9 FL (ref 80–99.9)
PLATELET # BLD AUTO: 405 K/UL (ref 130–450)
PMV BLD AUTO: 8.8 FL (ref 7–12)
POTASSIUM SERPL-SCNC: 4 MMOL/L (ref 3.5–5.1)
RBC # BLD AUTO: 3.78 M/UL (ref 3.5–5.5)
SODIUM SERPL-SCNC: 136 MMOL/L (ref 136–145)
WBC OTHER # BLD: 9.9 K/UL (ref 4.5–11.5)

## 2025-06-06 PROCEDURE — 6370000000 HC RX 637 (ALT 250 FOR IP)

## 2025-06-06 PROCEDURE — 97535 SELF CARE MNGMENT TRAINING: CPT

## 2025-06-06 PROCEDURE — 6360000002 HC RX W HCPCS: Performed by: INTERNAL MEDICINE

## 2025-06-06 PROCEDURE — 36415 COLL VENOUS BLD VENIPUNCTURE: CPT

## 2025-06-06 PROCEDURE — 6360000002 HC RX W HCPCS

## 2025-06-06 PROCEDURE — 6370000000 HC RX 637 (ALT 250 FOR IP): Performed by: SPECIALIST

## 2025-06-06 PROCEDURE — 71045 X-RAY EXAM CHEST 1 VIEW: CPT

## 2025-06-06 PROCEDURE — 6370000000 HC RX 637 (ALT 250 FOR IP): Performed by: NURSE PRACTITIONER

## 2025-06-06 PROCEDURE — 1200000000 HC SEMI PRIVATE

## 2025-06-06 PROCEDURE — 80048 BASIC METABOLIC PNL TOTAL CA: CPT

## 2025-06-06 PROCEDURE — 2500000003 HC RX 250 WO HCPCS

## 2025-06-06 PROCEDURE — 97530 THERAPEUTIC ACTIVITIES: CPT

## 2025-06-06 PROCEDURE — 85027 COMPLETE CBC AUTOMATED: CPT

## 2025-06-06 RX ORDER — CEPHALEXIN 500 MG/1
500 CAPSULE ORAL EVERY 8 HOURS SCHEDULED
Qty: 21 CAPSULE | Refills: 0 | Status: SHIPPED | OUTPATIENT
Start: 2025-06-06 | End: 2025-06-13

## 2025-06-06 RX ORDER — FUROSEMIDE 10 MG/ML
20 INJECTION INTRAMUSCULAR; INTRAVENOUS ONCE
Status: COMPLETED | OUTPATIENT
Start: 2025-06-06 | End: 2025-06-06

## 2025-06-06 RX ORDER — CEPHALEXIN 500 MG/1
500 CAPSULE ORAL EVERY 8 HOURS SCHEDULED
Status: DISCONTINUED | OUTPATIENT
Start: 2025-06-06 | End: 2025-06-09 | Stop reason: HOSPADM

## 2025-06-06 RX ORDER — CLOPIDOGREL BISULFATE 75 MG/1
75 TABLET ORAL ONCE
Status: DISCONTINUED | OUTPATIENT
Start: 2025-06-06 | End: 2025-06-06

## 2025-06-06 RX ADMIN — OXYCODONE 5 MG: 5 TABLET ORAL at 16:35

## 2025-06-06 RX ADMIN — CEPHALEXIN 500 MG: 500 CAPSULE ORAL at 16:38

## 2025-06-06 RX ADMIN — Medication 1 TABLET: at 07:50

## 2025-06-06 RX ADMIN — FUROSEMIDE 20 MG: 10 INJECTION, SOLUTION INTRAMUSCULAR; INTRAVENOUS at 16:36

## 2025-06-06 RX ADMIN — CYANOCOBALAMIN TAB 1000 MCG 500 MCG: 1000 TAB at 07:50

## 2025-06-06 RX ADMIN — HYDROMORPHONE HYDROCHLORIDE 0.25 MG: 1 INJECTION, SOLUTION INTRAMUSCULAR; INTRAVENOUS; SUBCUTANEOUS at 02:55

## 2025-06-06 RX ADMIN — ACETAMINOPHEN 650 MG: 325 TABLET ORAL at 16:38

## 2025-06-06 RX ADMIN — ACETAMINOPHEN 650 MG: 325 TABLET ORAL at 20:42

## 2025-06-06 RX ADMIN — ACETAMINOPHEN 650 MG: 325 TABLET ORAL at 02:55

## 2025-06-06 RX ADMIN — APIXABAN 2.5 MG: 2.5 TABLET, FILM COATED ORAL at 20:42

## 2025-06-06 RX ADMIN — LEVOTHYROXINE SODIUM 50 MCG: 0.05 TABLET ORAL at 05:45

## 2025-06-06 RX ADMIN — APIXABAN 2.5 MG: 2.5 TABLET, FILM COATED ORAL at 11:55

## 2025-06-06 RX ADMIN — CEPHALEXIN 500 MG: 500 CAPSULE ORAL at 20:42

## 2025-06-06 RX ADMIN — AMIODARONE HYDROCHLORIDE 50 MG: 200 TABLET ORAL at 07:51

## 2025-06-06 RX ADMIN — Medication 5000 UNITS: at 07:55

## 2025-06-06 RX ADMIN — DOCUSATE SODIUM 100 MG: 100 CAPSULE, LIQUID FILLED ORAL at 20:42

## 2025-06-06 RX ADMIN — OXYCODONE 5 MG: 5 TABLET ORAL at 11:51

## 2025-06-06 RX ADMIN — WATER 2000 MG: 1 INJECTION INTRAMUSCULAR; INTRAVENOUS; SUBCUTANEOUS at 11:54

## 2025-06-06 RX ADMIN — DOCUSATE SODIUM 100 MG: 100 CAPSULE, LIQUID FILLED ORAL at 07:50

## 2025-06-06 ASSESSMENT — PAIN DESCRIPTION - LOCATION
LOCATION: LEG
LOCATION: LEG
LOCATION: ANKLE
LOCATION: LEG
LOCATION: LEG

## 2025-06-06 ASSESSMENT — PAIN DESCRIPTION - ORIENTATION
ORIENTATION: RIGHT

## 2025-06-06 ASSESSMENT — PAIN DESCRIPTION - ONSET
ONSET: ON-GOING
ONSET: ON-GOING

## 2025-06-06 ASSESSMENT — PAIN DESCRIPTION - FREQUENCY: FREQUENCY: INTERMITTENT

## 2025-06-06 ASSESSMENT — PAIN SCALES - GENERAL
PAINLEVEL_OUTOF10: 4
PAINLEVEL_OUTOF10: 9
PAINLEVEL_OUTOF10: 8
PAINLEVEL_OUTOF10: 0
PAINLEVEL_OUTOF10: 7
PAINLEVEL_OUTOF10: 4

## 2025-06-06 ASSESSMENT — PAIN DESCRIPTION - DESCRIPTORS
DESCRIPTORS: ACHING;THROBBING;TENDER
DESCRIPTORS: ACHING;DISCOMFORT;THROBBING
DESCRIPTORS: ACHING;DISCOMFORT;THROBBING
DESCRIPTORS: ACHING;THROBBING;TENDER
DESCRIPTORS: DISCOMFORT
DESCRIPTORS: BURNING;THROBBING;SORE

## 2025-06-06 ASSESSMENT — PAIN SCALES - WONG BAKER: WONGBAKER_NUMERICALRESPONSE: NO HURT

## 2025-06-06 ASSESSMENT — PAIN DESCRIPTION - PAIN TYPE
TYPE: ACUTE PAIN;SURGICAL PAIN
TYPE: ACUTE PAIN
TYPE: ACUTE PAIN;SURGICAL PAIN
TYPE: ACUTE PAIN

## 2025-06-06 ASSESSMENT — PAIN - FUNCTIONAL ASSESSMENT
PAIN_FUNCTIONAL_ASSESSMENT: PREVENTS OR INTERFERES SOME ACTIVE ACTIVITIES AND ADLS
PAIN_FUNCTIONAL_ASSESSMENT: PREVENTS OR INTERFERES SOME ACTIVE ACTIVITIES AND ADLS
PAIN_FUNCTIONAL_ASSESSMENT: ACTIVITIES ARE NOT PREVENTED
PAIN_FUNCTIONAL_ASSESSMENT: PREVENTS OR INTERFERES SOME ACTIVE ACTIVITIES AND ADLS
PAIN_FUNCTIONAL_ASSESSMENT: PREVENTS OR INTERFERES SOME ACTIVE ACTIVITIES AND ADLS

## 2025-06-06 NOTE — DISCHARGE INSTRUCTIONS
R LE wound care:   Cleanse with NS and dry thoroughly  Pack with alginate. Cover with 4x4s and ABD pad.  Wrap from toes to just below the knee with kerlix, followed by ACE wrap  Change daily and PRN    Will arrange f/u in the wound care center        CONTINUE CEPHALEXIN FOR 7 MORE DAYS

## 2025-06-06 NOTE — CARE COORDINATION
6/6/2025Social work transition of care planning  Pt plan is to [facility],once medically stable Auth good til 6/10. Envelope in soft chart.  Electronically signed by SANDRINE Orellana on 6/6/2025 at 7:40 AM    Addendum: Sw set up will call ambulance with pas [phone].  Electronically signed by SANDRINE Orellana on 6/6/2025 at 9:47 AM    Addendum: Sw checking dc with attending.  Electronically signed by SANDRINE Orellana on 6/6/2025 at 11:50 AM    Addendum: Sw notified that dc held for today. Sw placed pt into will call ambulance with pas [phone]. Dtr,and facility updated. Facility can accept over the weekend. N/[phone]. Fax: [phone]   Electronically signed by SANDRINE Orellana on 6/6/2025 at 3:05 PM

## 2025-06-07 ENCOUNTER — APPOINTMENT (OUTPATIENT)
Dept: ULTRASOUND IMAGING | Age: 89
DRG: 571 | End: 2025-06-07
Payer: MEDICARE

## 2025-06-07 LAB
ANION GAP SERPL CALCULATED.3IONS-SCNC: 10 MMOL/L (ref 7–16)
BUN SERPL-MCNC: 39 MG/DL (ref 8–23)
CALCIUM SERPL-MCNC: 8.8 MG/DL (ref 8.8–10.2)
CHLORIDE SERPL-SCNC: 103 MMOL/L (ref 98–107)
CO2 SERPL-SCNC: 22 MMOL/L (ref 22–29)
CREAT SERPL-MCNC: 1.2 MG/DL (ref 0.5–1)
ERYTHROCYTE [DISTWIDTH] IN BLOOD BY AUTOMATED COUNT: 14.2 % (ref 11.5–15)
GFR, ESTIMATED: 41 ML/MIN/1.73M2
GLUCOSE SERPL-MCNC: 87 MG/DL (ref 74–99)
HCT VFR BLD AUTO: 32.3 % (ref 34–48)
HGB BLD-MCNC: 10.5 G/DL (ref 11.5–15.5)
MCH RBC QN AUTO: 29.2 PG (ref 26–35)
MCHC RBC AUTO-ENTMCNC: 32.5 G/DL (ref 32–34.5)
MCV RBC AUTO: 89.7 FL (ref 80–99.9)
MICROORGANISM SPEC CULT: NO GROWTH
MICROORGANISM/AGENT SPEC: NORMAL
PLATELET # BLD AUTO: 434 K/UL (ref 130–450)
PMV BLD AUTO: 9 FL (ref 7–12)
POTASSIUM SERPL-SCNC: 4.2 MMOL/L (ref 3.5–5.1)
RBC # BLD AUTO: 3.6 M/UL (ref 3.5–5.5)
SERVICE CMNT-IMP: NORMAL
SODIUM SERPL-SCNC: 135 MMOL/L (ref 136–145)
SPECIMEN DESCRIPTION: NORMAL
WBC OTHER # BLD: 8 K/UL (ref 4.5–11.5)

## 2025-06-07 PROCEDURE — 6360000002 HC RX W HCPCS: Performed by: INTERNAL MEDICINE

## 2025-06-07 PROCEDURE — 85027 COMPLETE CBC AUTOMATED: CPT

## 2025-06-07 PROCEDURE — 6370000000 HC RX 637 (ALT 250 FOR IP)

## 2025-06-07 PROCEDURE — 1200000000 HC SEMI PRIVATE

## 2025-06-07 PROCEDURE — 6370000000 HC RX 637 (ALT 250 FOR IP): Performed by: NURSE PRACTITIONER

## 2025-06-07 PROCEDURE — 6370000000 HC RX 637 (ALT 250 FOR IP): Performed by: SURGERY

## 2025-06-07 PROCEDURE — 80048 BASIC METABOLIC PNL TOTAL CA: CPT

## 2025-06-07 PROCEDURE — 6370000000 HC RX 637 (ALT 250 FOR IP): Performed by: SPECIALIST

## 2025-06-07 PROCEDURE — 36415 COLL VENOUS BLD VENIPUNCTURE: CPT

## 2025-06-07 PROCEDURE — 93971 EXTREMITY STUDY: CPT

## 2025-06-07 RX ORDER — SODIUM HYPOCHLORITE 2.5 MG/ML
SOLUTION TOPICAL DAILY
Status: DISCONTINUED | OUTPATIENT
Start: 2025-06-07 | End: 2025-06-09 | Stop reason: HOSPADM

## 2025-06-07 RX ORDER — FUROSEMIDE 10 MG/ML
20 INJECTION INTRAMUSCULAR; INTRAVENOUS ONCE
Status: COMPLETED | OUTPATIENT
Start: 2025-06-07 | End: 2025-06-07

## 2025-06-07 RX ADMIN — ACETAMINOPHEN 650 MG: 325 TABLET ORAL at 09:08

## 2025-06-07 RX ADMIN — CEPHALEXIN 500 MG: 500 CAPSULE ORAL at 14:03

## 2025-06-07 RX ADMIN — LEVOTHYROXINE SODIUM 50 MCG: 0.05 TABLET ORAL at 05:48

## 2025-06-07 RX ADMIN — CEPHALEXIN 500 MG: 500 CAPSULE ORAL at 05:48

## 2025-06-07 RX ADMIN — ACETAMINOPHEN 650 MG: 325 TABLET ORAL at 22:06

## 2025-06-07 RX ADMIN — OXYCODONE 5 MG: 5 TABLET ORAL at 14:03

## 2025-06-07 RX ADMIN — Medication 1 TABLET: at 08:05

## 2025-06-07 RX ADMIN — OXYCODONE 5 MG: 5 TABLET ORAL at 22:04

## 2025-06-07 RX ADMIN — APIXABAN 2.5 MG: 2.5 TABLET, FILM COATED ORAL at 22:03

## 2025-06-07 RX ADMIN — DOCUSATE SODIUM 100 MG: 100 CAPSULE, LIQUID FILLED ORAL at 22:02

## 2025-06-07 RX ADMIN — FUROSEMIDE 20 MG: 10 INJECTION, SOLUTION INTRAMUSCULAR; INTRAVENOUS at 09:08

## 2025-06-07 RX ADMIN — AMIODARONE HYDROCHLORIDE 50 MG: 200 TABLET ORAL at 08:03

## 2025-06-07 RX ADMIN — CYANOCOBALAMIN TAB 1000 MCG 500 MCG: 1000 TAB at 08:04

## 2025-06-07 RX ADMIN — ACETAMINOPHEN 650 MG: 325 TABLET ORAL at 04:52

## 2025-06-07 RX ADMIN — CEPHALEXIN 500 MG: 500 CAPSULE ORAL at 22:03

## 2025-06-07 RX ADMIN — ACETAMINOPHEN 650 MG: 325 TABLET ORAL at 17:02

## 2025-06-07 RX ADMIN — APIXABAN 2.5 MG: 2.5 TABLET, FILM COATED ORAL at 08:04

## 2025-06-07 RX ADMIN — ASPIRIN 81 MG: 81 TABLET, COATED ORAL at 08:04

## 2025-06-07 RX ADMIN — Medication 5000 UNITS: at 08:03

## 2025-06-07 RX ADMIN — DOCUSATE SODIUM 100 MG: 100 CAPSULE, LIQUID FILLED ORAL at 08:04

## 2025-06-07 ASSESSMENT — PAIN DESCRIPTION - DESCRIPTORS
DESCRIPTORS: ACHING;DISCOMFORT;TENDER
DESCRIPTORS: ACHING;DISCOMFORT
DESCRIPTORS: DISCOMFORT
DESCRIPTORS: ACHING;DISCOMFORT;BURNING;THROBBING
DESCRIPTORS: ACHING;DISCOMFORT;DULL
DESCRIPTORS: ACHING

## 2025-06-07 ASSESSMENT — PAIN SCALES - GENERAL
PAINLEVEL_OUTOF10: 7
PAINLEVEL_OUTOF10: 7
PAINLEVEL_OUTOF10: 0
PAINLEVEL_OUTOF10: 4
PAINLEVEL_OUTOF10: 2
PAINLEVEL_OUTOF10: 0
PAINLEVEL_OUTOF10: 2
PAINLEVEL_OUTOF10: 7
PAINLEVEL_OUTOF10: 4
PAINLEVEL_OUTOF10: 3

## 2025-06-07 ASSESSMENT — PAIN DESCRIPTION - PAIN TYPE
TYPE: SURGICAL PAIN;ACUTE PAIN
TYPE: ACUTE PAIN;SURGICAL PAIN
TYPE: SURGICAL PAIN;ACUTE PAIN
TYPE: SURGICAL PAIN;DEEP SOMATIC PAIN
TYPE: ACUTE PAIN;SURGICAL PAIN

## 2025-06-07 ASSESSMENT — PAIN DESCRIPTION - FREQUENCY
FREQUENCY: INTERMITTENT

## 2025-06-07 ASSESSMENT — PAIN DESCRIPTION - ORIENTATION
ORIENTATION: RIGHT;LEFT;POSTERIOR
ORIENTATION: RIGHT
ORIENTATION: RIGHT;POSTERIOR
ORIENTATION: RIGHT
ORIENTATION: RIGHT
ORIENTATION: RIGHT;LEFT

## 2025-06-07 ASSESSMENT — PAIN - FUNCTIONAL ASSESSMENT
PAIN_FUNCTIONAL_ASSESSMENT: PREVENTS OR INTERFERES SOME ACTIVE ACTIVITIES AND ADLS
PAIN_FUNCTIONAL_ASSESSMENT: ACTIVITIES ARE NOT PREVENTED
PAIN_FUNCTIONAL_ASSESSMENT: ACTIVITIES ARE NOT PREVENTED
PAIN_FUNCTIONAL_ASSESSMENT: PREVENTS OR INTERFERES SOME ACTIVE ACTIVITIES AND ADLS
PAIN_FUNCTIONAL_ASSESSMENT: ACTIVITIES ARE NOT PREVENTED
PAIN_FUNCTIONAL_ASSESSMENT: ACTIVITIES ARE NOT PREVENTED

## 2025-06-07 ASSESSMENT — PAIN SCALES - WONG BAKER: WONGBAKER_NUMERICALRESPONSE: HURTS A LITTLE BIT

## 2025-06-07 ASSESSMENT — PAIN DESCRIPTION - LOCATION
LOCATION: ARM;COCCYX
LOCATION: ARM;COCCYX
LOCATION: LEG
LOCATION: ARM;LEG
LOCATION: ABDOMEN

## 2025-06-07 ASSESSMENT — PAIN DESCRIPTION - ONSET
ONSET: GRADUAL

## 2025-06-07 NOTE — CONSULTS
[facility]  [address], [address]  [place] [address]    Pulmonary Consultation      Reason for Consult:  effusion    Requesting Physician:  Gene Flores DO    CHIEF COMPLAINT:  hypoxia and effusion    History Obtained From:  patient, electronic medical record    HISTORY OF PRESENT ILLNESS:                The patient is a 89 y.o. female with significant past medical history of TAVR and chronic pleural effusion who presents with swelling of right lower leg. Was diagnosed with DVT a few weeks ago, switched from eliquis 25 mg bid to coumadin, developed hematoma at right posterior calf, started on antibiotics, debridement and evacuation of hematoma done on 4th, plans made for discharge but apparently became sob, started on oxygen, CXR done. Currently lying down in bed on 3 li NC, claims feeling slightly sob, no cough/congestion, cool hand/fingers. 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; post pacemaker insertion tear of right ventricle    History of open heart surgery 10/2022    Tear of right ventricle post pacemaker insertion; [facility]    History of transcatheter aortic valve replacement (TAVR) 10/10/2022    [facility]    Iron deficiency     Pacemaker 10/2022    Postop TAVR, [facility]    PAF (paroxysmal atrial fibrillation) ([facility])     Postprocedural pneumothorax 6/11/2023    Pulmonary hypertension ([facility]) 08/02/2022    Sepsis ([facility]) 06/04/2023    Severe aortic stenosis by prior echocardiogram 08/02/2022    
Vascular Surgery Consultation Note    Reason for Consult:  RLE hematoma     HPI:    This is a 89 y.o. female who is admitted to the hospital for treatment of right lower extremity swelling.  Patient's family states that patient started having swelling in her right lower extremity around Thursday, said this worsened the next day and became more painful and the size increased as well.  Patient's family said that at the patient's facility she received an ultrasound that showed questionable DVT.  This was repeated here and her ultrasound was negative.  Patient denies any numbness or tingling.  Denies any problem with moving her right lower extremity.  She does not have any complaints other than the swelling.  Upon further questioning, patient's family also stated that she has a left lower quadrant abdominal swelling and a hematoma as well from the Lovenox injections.  Patient has been getting Lovenox injections as she was getting bridged from Eliquis to warfarin as her physician at her facility thought that she failed Eliquis due to the ultrasound results that he obtained.    Patient medical history CHF, CKD, DVT, aortic stenosis. no history of vascular surgery.  No history of diabetes        ROS: Negative if blank [], Positive if [x]  General Vascular   [] Fevers [] Claudication (Blocks)   [] Chills [] Rest Pain   [] Weight Loss [] Tissue Loss   [] Chest Pain [] Clotting Disorder   [] SOB at rest [] Leg Swelling   [] SOB with exertion [x] DVT/PE      [] Nausea    [] Vomiting [] Stroke/TIA   [] Abdominal Pain [] Focal weakness   [] Melena [] Slurred Speech   [] Hematochezia [] Vision Changes   [] Hematuria    [] Dysuria [] Hx of Central Catheters   [] Wears Glasses/Contacts [] Dialysis and If so date initiated   [] Blindness       [] Difficulty swallowing        Past Medical History:   Diagnosis Date    Acute on chronic heart failure with preserved ejection fraction (HCC) 06/05/2023    MAZIN (acute kidney injury) 
alert, and oriented.   Skin: Warm and dry. No rashes were noted. No jaundice.      HEENT: Eyes show round, and reactive pupils. Moist mucous membranes, no ulcerations, no thrush.   Neck: Supple to movements. No lymphadenopathy.   Chest: No use of accessory muscles to breathe. Symmetrical expansion. Auscultation reveals no wheezing, crackles, or rhonchi.   Cardiovascular: S1 and S2 are rhythmic and regular. No murmurs appreciated.   Abdomen: Positive bowel sounds to auscultation. Benign to palpation. No masses felt. No hepatosplenomegaly.  Genitourinary: Female  Extremities: No clubbing, no cyanosis, no edema.  Musculoskeletal: Equal and symmetrical except for the hematoma on the right lower extremity.  Associated cellulitis around the area noted  Neurological: No focal but there is auditory deficit  Lines: peripheral      CBC+dif:  Recent Labs     05/31/25 2059   WBC 9.7   HGB 13.4   HCT 41.1   MCV 88.2      NEUTROABS 5.43     Lab Results   Component Value Date    CRP 48.6 (H) 05/31/2025    CRP 4.6 (H) 06/12/2023    CRP 6.0 (H) 06/11/2023     No results found for: \"CRPHS\"  Lab Results   Component Value Date    SEDRATE 24 (H) 05/31/2025    SEDRATE 42 (H) 06/12/2023    SEDRATE 64 (H) 06/11/2023     Lab Results   Component Value Date    ALT 19 05/31/2025    AST 56 (H) 05/31/2025    ALKPHOS 137 (H) 05/31/2025    BILITOT 0.4 05/31/2025     Lab Results   Component Value Date/Time     05/31/2025 08:59 PM    K 4.0 05/31/2025 08:59 PM    K 3.2 06/04/2023 06:02 PM     05/31/2025 08:59 PM    CO2 22 05/31/2025 08:59 PM    BUN 29 05/31/2025 08:59 PM    CREATININE 1.6 05/31/2025 08:59 PM    GFRAA 50 08/03/2022 04:40 AM    LABGLOM 29 05/31/2025 08:59 PM    LABGLOM 46 06/12/2023 02:30 AM    GLUCOSE 117 05/31/2025 08:59 PM    CALCIUM 9.0 05/31/2025 08:59 PM    BILITOT 0.4 05/31/2025 08:59 PM    ALKPHOS 137 05/31/2025 08:59 PM    AST 56 05/31/2025 08:59 PM    ALT 19 05/31/2025 08:59 PM       Lab Results

## 2025-06-07 NOTE — CARE COORDINATION
Care Coordination:  Chart reviewed for discharge plan.  Large Left pleural effusion.  Pulmonary consult ordered. Possible thoracentesis. [facility]   [phone]. Patient on will call with PAS .  Facility will accept on weekend if stable.   Auth good till 6/10.  Continue to follow.

## 2025-06-08 ENCOUNTER — APPOINTMENT (OUTPATIENT)
Dept: GENERAL RADIOLOGY | Age: 89
DRG: 571 | End: 2025-06-08
Payer: MEDICARE

## 2025-06-08 PROCEDURE — 6370000000 HC RX 637 (ALT 250 FOR IP): Performed by: SPECIALIST

## 2025-06-08 PROCEDURE — 71045 X-RAY EXAM CHEST 1 VIEW: CPT

## 2025-06-08 PROCEDURE — 6370000000 HC RX 637 (ALT 250 FOR IP): Performed by: NURSE PRACTITIONER

## 2025-06-08 PROCEDURE — 1200000000 HC SEMI PRIVATE

## 2025-06-08 PROCEDURE — 6370000000 HC RX 637 (ALT 250 FOR IP)

## 2025-06-08 PROCEDURE — 6370000000 HC RX 637 (ALT 250 FOR IP): Performed by: SURGERY

## 2025-06-08 RX ADMIN — Medication 5000 UNITS: at 10:11

## 2025-06-08 RX ADMIN — OXYCODONE 5 MG: 5 TABLET ORAL at 20:00

## 2025-06-08 RX ADMIN — CEPHALEXIN 500 MG: 500 CAPSULE ORAL at 14:01

## 2025-06-08 RX ADMIN — ASPIRIN 81 MG: 81 TABLET, COATED ORAL at 10:11

## 2025-06-08 RX ADMIN — ACETAMINOPHEN 650 MG: 325 TABLET ORAL at 16:45

## 2025-06-08 RX ADMIN — CYANOCOBALAMIN TAB 1000 MCG 500 MCG: 1000 TAB at 10:12

## 2025-06-08 RX ADMIN — HYOSCYAMINE SULFATE: 16 SOLUTION at 10:14

## 2025-06-08 RX ADMIN — DOCUSATE SODIUM 100 MG: 100 CAPSULE, LIQUID FILLED ORAL at 10:12

## 2025-06-08 RX ADMIN — AMIODARONE HYDROCHLORIDE 50 MG: 200 TABLET ORAL at 10:12

## 2025-06-08 RX ADMIN — Medication 1 TABLET: at 10:13

## 2025-06-08 RX ADMIN — CEPHALEXIN 500 MG: 500 CAPSULE ORAL at 20:02

## 2025-06-08 RX ADMIN — DOCUSATE SODIUM 100 MG: 100 CAPSULE, LIQUID FILLED ORAL at 20:00

## 2025-06-08 RX ADMIN — LEVOTHYROXINE SODIUM 50 MCG: 0.05 TABLET ORAL at 05:49

## 2025-06-08 RX ADMIN — ACETAMINOPHEN 650 MG: 325 TABLET ORAL at 10:11

## 2025-06-08 RX ADMIN — APIXABAN 2.5 MG: 2.5 TABLET, FILM COATED ORAL at 20:02

## 2025-06-08 RX ADMIN — CEPHALEXIN 500 MG: 500 CAPSULE ORAL at 04:26

## 2025-06-08 RX ADMIN — ACETAMINOPHEN 650 MG: 325 TABLET ORAL at 04:25

## 2025-06-08 RX ADMIN — APIXABAN 2.5 MG: 2.5 TABLET, FILM COATED ORAL at 10:12

## 2025-06-08 ASSESSMENT — PAIN SCALES - GENERAL
PAINLEVEL_OUTOF10: 0
PAINLEVEL_OUTOF10: 6
PAINLEVEL_OUTOF10: 0
PAINLEVEL_OUTOF10: 5
PAINLEVEL_OUTOF10: 3

## 2025-06-08 ASSESSMENT — PAIN DESCRIPTION - ONSET
ONSET: GRADUAL

## 2025-06-08 ASSESSMENT — PAIN DESCRIPTION - LOCATION
LOCATION: LEG
LOCATION: ARM;LEG
LOCATION: LEG
LOCATION: LEG
LOCATION: LEG;HIP;BUTTOCKS

## 2025-06-08 ASSESSMENT — PAIN DESCRIPTION - DESCRIPTORS
DESCRIPTORS: ACHING;DISCOMFORT;TENDER
DESCRIPTORS: BURNING;DISCOMFORT;DULL
DESCRIPTORS: ACHING;DULL

## 2025-06-08 ASSESSMENT — PAIN DESCRIPTION - FREQUENCY
FREQUENCY: INTERMITTENT

## 2025-06-08 ASSESSMENT — PAIN DESCRIPTION - ORIENTATION
ORIENTATION: RIGHT

## 2025-06-08 ASSESSMENT — PAIN DESCRIPTION - PAIN TYPE
TYPE: CHRONIC PAIN;SURGICAL PAIN
TYPE: CHRONIC PAIN;SURGICAL PAIN

## 2025-06-08 ASSESSMENT — PAIN - FUNCTIONAL ASSESSMENT
PAIN_FUNCTIONAL_ASSESSMENT: PREVENTS OR INTERFERES SOME ACTIVE ACTIVITIES AND ADLS
PAIN_FUNCTIONAL_ASSESSMENT: ACTIVITIES ARE NOT PREVENTED
PAIN_FUNCTIONAL_ASSESSMENT: PREVENTS OR INTERFERES SOME ACTIVE ACTIVITIES AND ADLS
PAIN_FUNCTIONAL_ASSESSMENT: ACTIVITIES ARE NOT PREVENTED

## 2025-06-08 NOTE — FLOWSHEET NOTE
Family at desk and asking for therapy to see pt.   Ramirez from therapy added to list, but had to go to other unit. Will try to come see pt today

## 2025-06-09 VITALS
WEIGHT: 170 LBS | HEIGHT: 63 IN | DIASTOLIC BLOOD PRESSURE: 67 MMHG | TEMPERATURE: 97.5 F | BODY MASS INDEX: 30.12 KG/M2 | RESPIRATION RATE: 16 BRPM | HEART RATE: 70 BPM | SYSTOLIC BLOOD PRESSURE: 97 MMHG | OXYGEN SATURATION: 93 %

## 2025-06-09 LAB
MICROORGANISM SPEC CULT: NORMAL
SERVICE CMNT-IMP: NORMAL
SPECIMEN DESCRIPTION: NORMAL

## 2025-06-09 PROCEDURE — 97530 THERAPEUTIC ACTIVITIES: CPT

## 2025-06-09 PROCEDURE — 6370000000 HC RX 637 (ALT 250 FOR IP): Performed by: NURSE PRACTITIONER

## 2025-06-09 PROCEDURE — 6370000000 HC RX 637 (ALT 250 FOR IP): Performed by: SURGERY

## 2025-06-09 PROCEDURE — 6370000000 HC RX 637 (ALT 250 FOR IP)

## 2025-06-09 PROCEDURE — 97535 SELF CARE MNGMENT TRAINING: CPT

## 2025-06-09 PROCEDURE — 6370000000 HC RX 637 (ALT 250 FOR IP): Performed by: SPECIALIST

## 2025-06-09 RX ADMIN — LEVOTHYROXINE SODIUM 50 MCG: 0.05 TABLET ORAL at 06:33

## 2025-06-09 RX ADMIN — CEPHALEXIN 500 MG: 500 CAPSULE ORAL at 06:33

## 2025-06-09 RX ADMIN — ACETAMINOPHEN 650 MG: 325 TABLET ORAL at 06:33

## 2025-06-09 RX ADMIN — DOCUSATE SODIUM 100 MG: 100 CAPSULE, LIQUID FILLED ORAL at 08:59

## 2025-06-09 RX ADMIN — ASPIRIN 81 MG: 81 TABLET, COATED ORAL at 08:55

## 2025-06-09 RX ADMIN — CYANOCOBALAMIN TAB 1000 MCG 500 MCG: 1000 TAB at 08:54

## 2025-06-09 RX ADMIN — ACETAMINOPHEN 650 MG: 325 TABLET ORAL at 08:54

## 2025-06-09 RX ADMIN — Medication 1 TABLET: at 08:56

## 2025-06-09 RX ADMIN — APIXABAN 2.5 MG: 2.5 TABLET, FILM COATED ORAL at 08:55

## 2025-06-09 RX ADMIN — Medication 5000 UNITS: at 08:56

## 2025-06-09 RX ADMIN — ACETAMINOPHEN 650 MG: 325 TABLET ORAL at 00:42

## 2025-06-09 ASSESSMENT — PAIN DESCRIPTION - ORIENTATION
ORIENTATION: RIGHT
ORIENTATION: RIGHT
ORIENTATION: POSTERIOR

## 2025-06-09 ASSESSMENT — PAIN SCALES - GENERAL
PAINLEVEL_OUTOF10: 2
PAINLEVEL_OUTOF10: 0

## 2025-06-09 ASSESSMENT — PAIN DESCRIPTION - DESCRIPTORS
DESCRIPTORS: ACHING
DESCRIPTORS: ACHING;SORE
DESCRIPTORS: DISCOMFORT

## 2025-06-09 ASSESSMENT — PAIN - FUNCTIONAL ASSESSMENT
PAIN_FUNCTIONAL_ASSESSMENT: ACTIVITIES ARE NOT PREVENTED
PAIN_FUNCTIONAL_ASSESSMENT: PREVENTS OR INTERFERES SOME ACTIVE ACTIVITIES AND ADLS
PAIN_FUNCTIONAL_ASSESSMENT: ACTIVITIES ARE NOT PREVENTED

## 2025-06-09 ASSESSMENT — PAIN DESCRIPTION - ONSET: ONSET: GRADUAL

## 2025-06-09 ASSESSMENT — PAIN DESCRIPTION - LOCATION
LOCATION: GENERALIZED;LEG
LOCATION: FOOT
LOCATION: GENERALIZED;BUTTOCKS

## 2025-06-09 ASSESSMENT — PAIN DESCRIPTION - FREQUENCY: FREQUENCY: INTERMITTENT

## 2025-06-09 ASSESSMENT — PAIN DESCRIPTION - PAIN TYPE: TYPE: ACUTE PAIN;SURGICAL PAIN

## 2025-06-09 NOTE — PROGRESS NOTES
[facility]  [address], [address]  [place] [address]      Pulmonary Progress Note      SUBJECTIVE:  lying down in bed, looking similar with respiratory function, on 3 li NC saturating 95-96%, no cough/congestion    OBJECTIVE    Medications    Continuous Infusions:    Scheduled Meds:   sodium hypochlorite   Irrigation Daily    apixaban  2.5 mg Oral BID    cephALEXin  500 mg Oral 3 times per day    acetaminophen  650 mg Oral Q6H    levothyroxine  50 mcg Oral Daily    amiodarone  50 mg Oral Daily    ocuvite-lutein  1 tablet Oral Daily    vitamin B-12  500 mcg Oral Daily    vitamin D3  5,000 Units Oral Daily    aspirin  81 mg Oral Daily    docusate sodium  100 mg Oral BID       PRN Meds:oxyCODONE **OR** oxyCODONE, HYDROmorphone    Physical    VITALS:  /71   Pulse 65   Temp 97.2 °F (36.2 °C) (Temporal)   Resp 16   Ht 1.6 m (5' 2.99\")   Wt 77.1 kg (170 lb)   SpO2 94%   BMI 30.12 kg/m²     24HR INTAKE/OUTPUT:      Intake/Output Summary (Last 24 hours) at 2025 1517  Last data filed at 2025 0800  Gross per 24 hour   Intake 1000 ml   Output 900 ml   Net 100 ml       24HR PULSE OXIMETRY RANGE:    SpO2  Av %  Min: 94 %  Max: 96 %    General appearance: alert, appears stated age, and cooperative  Lungs: clear to auscultation bilaterally  Heart: regular rate and rhythm, S1, S2 normal, no murmur, click, rub or gallop  Abdomen: soft, non-tender; bowel sounds normal; no masses,  no organomegaly  Extremities: extremities normal, atraumatic, no cyanosis or edema  Neurologic: Mental status: Alert, oriented, thought content appropriate    Data    CBC:   Recent Labs     25  0745 25  0627   WBC 9.9 8.0   HGB 10.9* 10.5*   HCT 33.6* 32.3*   MCV 88.9 89.7    434       BMP:  Recent Labs     25  0745 25  06    135*   K 4.0 4.2    103   CO2 22 22   BUN 34* 39*   CREATININE 1.1* 1.2*    
  Date: 2025       Patient Name: Sil Rascon  : 1926      MRN: [id number]    PT order received and chart reviewed. PT evaluation held due to INR 5.5.     Grant Gordon PT, DPT  [id number]      
 Vascular Surgery Progress Note    Subjective  Feeling well. No issues this morning. Patient was on room air for a while yesterday evening, then required 2L NC overnight.     Current Medications:      oxyCODONE **OR** oxyCODONE, HYDROmorphone    sodium hypochlorite   Irrigation Daily    apixaban  2.5 mg Oral BID    cephALEXin  500 mg Oral 3 times per day    acetaminophen  650 mg Oral Q6H    levothyroxine  50 mcg Oral Daily    amiodarone  50 mg Oral Daily    ocuvite-lutein  1 tablet Oral Daily    vitamin B-12  500 mcg Oral Daily    vitamin D3  5,000 Units Oral Daily    aspirin  81 mg Oral Daily    docusate sodium  100 mg Oral BID        PHYSICAL EXAM:    /71   Pulse 65   Temp 97.2 °F (36.2 °C) (Temporal)   Resp 18   Ht 1.6 m (5' 2.99\")   Wt 77.1 kg (170 lb)   SpO2 94%   BMI 30.12 kg/m²     Intake/Output Summary (Last 24 hours) at 6/9/2025 0647  Last data filed at 6/8/2025 1300  Gross per 24 hour   Intake 720 ml   Output --   Net 720 ml          Gen: awake, alert, no apparent distress  CVS: RRR  Resp: Mild increased work of breathing.   R LE: No strikethrough on dressing. Wound bed with fibrinous film. Dressing changed this AM          LABS:    Lab Results   Component Value Date    WBC 8.0 06/07/2025    HGB 10.5 (L) 06/07/2025    HCT 32.3 (L) 06/07/2025     06/07/2025    PROTIME 18.3 (H) 06/03/2025    INR 1.7 06/03/2025    APTT 52.5 (H) 05/31/2025    K 4.2 06/07/2025    BUN 39 (H) 06/07/2025    CREATININE 1.2 (H) 06/07/2025       A/P  89 y.o. female with right lower extremity hematoma and skin compromise status post I&D and evacuation of hematoma with debridement of necrosed skin 6/4    - Continue with regular diet, wound healing supplements   - ID consulted for RLE cellulitis, recommendations appreciated - Please elevate right lower extremity and continue with compression  - Eliquis was resumed 6/6  - AM labs pending   - dakins soaked gauze instead of opticell. Wet to dry BID   - ok for discharge 
 Vascular Surgery Progress Note    Subjective  Feeling well. No issues with her RLE. States her breathing feels about the same as yesterday     Current Medications:      oxyCODONE **OR** oxyCODONE, HYDROmorphone    sodium hypochlorite   Irrigation Daily    apixaban  2.5 mg Oral BID    cephALEXin  500 mg Oral 3 times per day    acetaminophen  650 mg Oral Q6H    levothyroxine  50 mcg Oral Daily    amiodarone  50 mg Oral Daily    ocuvite-lutein  1 tablet Oral Daily    vitamin B-12  500 mcg Oral Daily    vitamin D3  5,000 Units Oral Daily    aspirin  81 mg Oral Daily    docusate sodium  100 mg Oral BID        PHYSICAL EXAM:    /68   Pulse 69   Temp 97.1 °F (36.2 °C) (Temporal)   Resp 18   Ht 1.6 m (5' 2.99\")   Wt 77.1 kg (170 lb)   SpO2 96%   BMI 30.12 kg/m²     Intake/Output Summary (Last 24 hours) at 6/8/2025 0744  Last data filed at 6/7/2025 1925  Gross per 24 hour   Intake 520 ml   Output 900 ml   Net -380 ml          Gen: awake, alert, no apparent distress  CVS: RRR  Resp: Mild increased work of breathing.   R LE: Wrapped with Ace wrapping.  No strikethrough. Wound bed with fibrinous film. Dressing changed         LABS:    Lab Results   Component Value Date    WBC 8.0 06/07/2025    HGB 10.5 (L) 06/07/2025    HCT 32.3 (L) 06/07/2025     06/07/2025    PROTIME 18.3 (H) 06/03/2025    INR 1.7 06/03/2025    APTT 52.5 (H) 05/31/2025    K 4.2 06/07/2025    BUN 39 (H) 06/07/2025    CREATININE 1.2 (H) 06/07/2025       A/P  89 y.o. female with right lower extremity hematoma and skin compromise status post I&D and evacuation of hematoma with debridement of necrosed skin 6/4    - Continue with regular diet, wound healing supplements ordered  - ID consulted for RLE cellulitis; continue Cephalexin, surgical cultures pending  -Please elevate right lower extremity and continue with compression  - AM labs pending   - Eliquis was resumed   - dakins soaked gauze instead of opticell. Wet to dry BID       Juliane Silverio, 
 Vascular Surgery Progress Note    Subjective  No issues overnight.  Vital signs stable.  Denies having any pain. Patient discharged yesterday but remains inpatient because of SOB and increased oxygen requirements.     Current Medications:      oxyCODONE **OR** oxyCODONE, HYDROmorphone    apixaban  2.5 mg Oral BID    cephALEXin  500 mg Oral 3 times per day    acetaminophen  650 mg Oral Q6H    levothyroxine  50 mcg Oral Daily    amiodarone  50 mg Oral Daily    ocuvite-lutein  1 tablet Oral Daily    vitamin B-12  500 mcg Oral Daily    vitamin D3  5,000 Units Oral Daily    aspirin  81 mg Oral Daily    docusate sodium  100 mg Oral BID        PHYSICAL EXAM:    /60   Pulse 64   Temp 97.6 °F (36.4 °C) (Temporal)   Resp 18   Ht 1.6 m (5' 2.99\")   Wt 77.1 kg (170 lb)   SpO2 94%   BMI 30.12 kg/m²     Intake/Output Summary (Last 24 hours) at 6/7/2025 0725  Last data filed at 6/7/2025 0555  Gross per 24 hour   Intake 710 ml   Output 650 ml   Net 60 ml          Gen: awake, alert, no apparent distress  CVS: RRR  Resp: Mild increased work of breathing.   R LE: Wrapped with Ace wrapping.  No strikethrough. Wound with superficial film and granulation tissue.             LABS:    Lab Results   Component Value Date    WBC 9.9 06/06/2025    HGB 10.9 (L) 06/06/2025    HCT 33.6 (L) 06/06/2025     06/06/2025    PROTIME 18.3 (H) 06/03/2025    INR 1.7 06/03/2025    APTT 52.5 (H) 05/31/2025    K 4.0 06/06/2025    BUN 34 (H) 06/06/2025    CREATININE 1.1 (H) 06/06/2025       A/P  89 y.o. female with right lower extremity hematoma and skin compromise status post I&D and evacuation of hematoma with debridement of necrosed skin 6/4    - Continue with regular diet, wound healing supplements ordered  - ID consulted for RLE cellulitis; continue Cephalexin, surgical cultures pending  -Please elevate right lower extremity and continue with compression  -Continue holding anticoagulation  - dressing changes BID; will change from 
 Vascular Surgery Progress Note    Subjective  No issues overnight.  Vital signs stable.  No complaint.    Current Medications:      oxyCODONE **OR** oxyCODONE, HYDROmorphone    acetaminophen  650 mg Oral Q6H    ceFAZolin  2,000 mg IntraVENous Q12H    levothyroxine  50 mcg Oral Daily    amiodarone  50 mg Oral Daily    ocuvite-lutein  1 tablet Oral Daily    vitamin B-12  500 mcg Oral Daily    vitamin D3  5,000 Units Oral Daily    [Held by provider] aspirin  81 mg Oral Daily    docusate sodium  100 mg Oral BID        PHYSICAL EXAM:    /64   Pulse 68   Temp 98 °F (36.7 °C) (Temporal)   Resp 18   Ht 1.6 m (5' 2.99\")   Wt 77.1 kg (170 lb)   SpO2 96%   BMI 30.12 kg/m²     Intake/Output Summary (Last 24 hours) at 6/5/2025 0654  Last data filed at 6/4/2025 1615  Gross per 24 hour   Intake 440 ml   Output --   Net 440 ml          Gen: awake, alert and oriented x3, no apparent distress  CVS: RRR  Resp: No increased work of breathing  Abd: Soft, non-tender, non-distended  R LE: Wrapped with Ace wrapping.  No strikethrough    LABS:    Lab Results   Component Value Date    WBC 10.8 06/03/2025    HGB 10.7 (L) 06/03/2025    HCT 33.3 (L) 06/03/2025     06/03/2025    PROTIME 18.3 (H) 06/03/2025    INR 1.7 06/03/2025    APTT 52.5 (H) 05/31/2025    K 4.0 05/31/2025    BUN 29 (H) 05/31/2025    CREATININE 1.6 (H) 05/31/2025       A/P  89 y.o. female with right lower extremity hematoma and skin compromise status post I&D and evacuation of hematoma with debridement of necrosed skin 6/4    - Continue with regular diet, wound healing supplements ordered  - ID consulted for possible RLE cellulitis, recommendations appreciated.   -Please elevate right lower extremity and continue with compression  -Continue holding anticoagulation  - Will change dressing today in the p.m.  - Rest per primary      Chris Velásquez,     Pt seen and examined  Feeling better  Pain controlled  Wound does appear better  Skin edges appear 
 Vascular Surgery Progress Note    Subjective  No issues overnight.  Vital signs stable.  No complaints.    Current Medications:      oxyCODONE **OR** oxyCODONE, HYDROmorphone    acetaminophen  650 mg Oral Q6H    ceFAZolin  2,000 mg IntraVENous Q12H    levothyroxine  50 mcg Oral Daily    amiodarone  50 mg Oral Daily    ocuvite-lutein  1 tablet Oral Daily    vitamin B-12  500 mcg Oral Daily    vitamin D3  5,000 Units Oral Daily    [Held by provider] aspirin  81 mg Oral Daily    docusate sodium  100 mg Oral BID        PHYSICAL EXAM:    /69   Pulse 65   Temp 98.2 °F (36.8 °C) (Temporal)   Resp 18   Ht 1.6 m (5' 2.99\")   Wt 77.1 kg (170 lb)   SpO2 93%   BMI 30.12 kg/m²     Intake/Output Summary (Last 24 hours) at 6/6/2025 0713  Last data filed at 6/5/2025 1849  Gross per 24 hour   Intake 120 ml   Output 0 ml   Net 120 ml          Gen: awake, alert and oriented x3, no apparent distress  CVS: RRR  Resp: No increased work of breathing  Abd: Soft, non-tender, non-distended  R LE: Wrapped with Ace wrapping.  No strikethrough. Wound with granulation tissue. Edges of skin looks good.           LABS:    Lab Results   Component Value Date    WBC 10.0 06/05/2025    HGB 11.3 (L) 06/05/2025    HCT 35.4 06/05/2025     06/05/2025    PROTIME 18.3 (H) 06/03/2025    INR 1.7 06/03/2025    APTT 52.5 (H) 05/31/2025    K 4.0 06/05/2025    BUN 30 (H) 06/05/2025    CREATININE 1.1 (H) 06/05/2025       A/P  89 y.o. female with right lower extremity hematoma and skin compromise status post I&D and evacuation of hematoma with debridement of necrosed skin 6/4    - Continue with regular diet, wound healing supplements ordered  - ID consulted for possible RLE cellulitis, recommendations appreciated.   -Please elevate right lower extremity and continue with compression  -Continue holding anticoagulation  - Rest per primary      Chris Velásquez DO        
 Vascular Surgery Progress Note    Subjective  Pt s/e.  Says her pain is much improved. Wound now has a puncture in the middle that is draining old blood.     Current Medications:      HYDROcodone 5 mg - acetaminophen    ceFAZolin  2,000 mg IntraVENous Q12H    levothyroxine  50 mcg Oral Daily    amiodarone  50 mg Oral Daily    ocuvite-lutein  1 tablet Oral Daily    vitamin B-12  500 mcg Oral Daily    vitamin D3  5,000 Units Oral Daily    [Held by provider] aspirin  81 mg Oral Daily    docusate sodium  100 mg Oral BID        PHYSICAL EXAM:    /67   Pulse 80   Temp 98.9 °F (37.2 °C) (Temporal)   Resp 16   Ht 1.6 m (5' 2.99\")   Wt 77.1 kg (170 lb)   SpO2 92%   BMI 30.12 kg/m²     Intake/Output Summary (Last 24 hours) at 6/3/2025 0704  Last data filed at 6/2/2025 1813  Gross per 24 hour   Intake 240 ml   Output 700 ml   Net -460 ml          Gen: awake, alert and oriented x3, no apparent distress  CVS: RRR  Resp: No increased work of breathing  Abd: Soft, non-tender, non-distended  R LE: Hemtoma, significant hematoma. Compartments soft.    LABS:    Lab Results   Component Value Date    WBC 9.7 05/31/2025    HGB 13.4 05/31/2025    HCT 41.1 05/31/2025     05/31/2025    PROTIME 61.4 (H) 06/02/2025    INR 5.5 (HH) 06/02/2025    APTT 52.5 (H) 05/31/2025    K 4.0 05/31/2025    BUN 29 (H) 05/31/2025    CREATININE 1.6 (H) 05/31/2025       A/P  89 y.o. female with what seems to be right lower extremity hematoma     - ID consulted for possible RLE cellulitis, recommendations appreciated.   -Please elevate right lower extremity and continue with compression  -Monitor wound for skin necrosis and tissue loss  - most recent INR 5.2 (5.1). continue to hold anticoagulation including lovenox. C/w daily INR and CBC checks   -No acute surgical interventions planned now, will let it declare itself and decide if patient needs an I&D vs debridement.   - Rest per primary      Chris Velásquez, DO    Pt seen and 
4 Eyes Skin Assessment     NAME:  Sil Rascon  YOB: 1926  MEDICAL RECORD NUMBER:  [id number]    The patient is being assessed for  Admission    I agree that at least one RN has performed a thorough Head to Toe Skin Assessment on the patient. ALL assessment sites listed below have been assessed.      Areas assessed by both nurses:    Head, Face, Ears, Shoulders, Back, Chest, Arms, Elbows, Hands, Sacrum. Buttock, Coccyx, Ischium, Legs. Feet and Heels, and Under Medical Devices         Does the Patient have a Wound? Yes wound(s) were present on assessment. LDA wound assessment was Initiated and completed by RN       Dayne Prevention initiated by RN: Yes  Wound Care Orders initiated by RN: Yes    Pressure Injury (Stage 3,4, Unstageable, DTI, NWPT, and Complex wounds) if present, place Wound referral order by RN under : No    New Ostomies, if present place, Ostomy referral order under : No     Nurse 1 eSignature: Electronically signed by Mallika Lenz RN on 6/1/25 at 7:01 AM EDT    **SHARE this note so that the co-signing nurse can place an eSignature**    Nurse 2 eSignature: Electronically signed by Lisette Chen RN on 6/1/25 at 7:04 AM EDT  
Comprehensive Nutrition Assessment    Type and Reason for Visit:  Initial, LOS    Nutrition Recommendations/Plan:   Continue current diet  Start ensure BID and maria a BID to optimize wound healing and further promote oral intake  Will monitor     Malnutrition Assessment:  Malnutrition Status:  Insufficient data (06/06/25 1341)    Context:  Acute Illness     Findings of the 6 clinical characteristics of malnutrition:  Energy Intake:  Mild decrease in energy intake  Weight Loss:  Unable to assess (d/t lack of wt hx per EMR)     Body Fat Loss:  Unable to assess     Muscle Mass Loss:  Unable to assess    Fluid Accumulation:  No fluid accumulation     Strength:  Not Performed    Nutrition Assessment:    pt adm d/t wound check/cellutlitis/hematoma now s/p I&D w/ evac of hematoma 6/4; PMhx of DVT, AFIB; MAZIN noted; will modify ONS to further optimize wound healing & promote oral intake; will monitor.    Nutrition Related Findings:    A&Ox4; active BS; +2 edema; I/O WNL Wound Type: Surgical Incision       Current Nutrition Intake & Therapies:    Average Meal Intake: 51-75%, 26-50% (sporadic this adm)  Average Supplements Intake: Unable to assess  ADULT DIET; Regular  ADULT ORAL NUTRITION SUPPLEMENT; Breakfast, Lunch; Wound Healing Oral Supplement  ADULT ORAL NUTRITION SUPPLEMENT; Lunch, Dinner; Standard High Calorie/High Protein Oral Supplement    Anthropometric Measures:  Height: 160 cm (5' 2.99\")  Ideal Body Weight (IBW): 115 lbs (52 kg)       Current Body Weight: 77.1 kg (169 lb 15.6 oz) (5/31-no method), 147.8 % IBW.    Current BMI (kg/m2): 30.1  Usual Body Weight:  (UTO d/t lack of wt hx per EMR)        Weight Adjustment For: No Adjustment                 BMI Categories: Obese Class 1 (BMI 30.0-34.9)    Estimated Daily Nutrient Needs:  Energy Requirements Based On: Formula  Weight Used for Energy Requirements: Current  Energy (kcal/day): MSJ x 1.2 SF= 5207-3916  Weight Used for Protein Requirements: Ideal  Protein 
Dr Amor notified of INR=5.5  
Dr Amor notified that  Wheezy in upper lobes. Became winded when she worked with therapy. Told her RN she doesn't feel well. back on O2 95% on 3 liters. Orders placed.No discharge today.  
Dr. Amor notified of the critical lab of 5.2 for the PT/ INR that was drawn. Awaiting response no further orders entered at this time  
[facility]  [facility]  Progress Note      Chief Complaint   Patient presents with    Wound Check     Sent in to be admitted. Has DVT in right leg, on thinner. Now has developed cellulitis. Denies fever, has drainage to leg    Leg Pain       SUBJECTIVE:  Patient is tolerating medications. No reported adverse drug reactions.  No nausea, vomiting, diarrhea.    Review of systems:  As stated above in the chief complaint, otherwise negative.    Medications:  Scheduled Meds:   ceFAZolin  2,000 mg IntraVENous Q12H    levothyroxine  50 mcg Oral Daily    amiodarone  50 mg Oral Daily    ocuvite-lutein  1 tablet Oral Daily    vitamin B-12  500 mcg Oral Daily    vitamin D3  5,000 Units Oral Daily    [Held by provider] aspirin  81 mg Oral Daily    docusate sodium  100 mg Oral BID     Continuous Infusions:  PRN Meds:HYDROcodone 5 mg - acetaminophen    OBJECTIVE:  /61   Pulse 75   Temp 97.6 °F (36.4 °C) (Temporal)   Resp 18   Ht 1.6 m (5' 2.99\")   Wt 77.1 kg (170 lb)   SpO2 98%   BMI 30.12 kg/m²   Temp  Av.3 °F (36.8 °C)  Min: 97.6 °F (36.4 °C)  Max: 98.9 °F (37.2 °C)  Constitutional: The patient is awake, alert, and oriented.   Skin: Warm and dry. No rashes were noted.   HEENT: Round and reactive pupils.  Moist mucous membranes.  No ulcerations or thrush.  Neck: Supple to movements.   Chest: No use of accessory muscles to breathe. Symmetrical expansion.  No wheezing, crackles or rhonchi.  Cardiovascular: S1 and S2 are rhythmic and regular. No murmurs appreciated.   Abdomen: Positive bowel sounds to auscultation. Benign to palpation. No masses felt. No hepatosplenomegaly.  Genitourinary: Female  Extremities: No clubbing, no cyanosis, no edema.  Right leg hematoma stablefor the puncture site noted  Lines: peripheral    Laboratory and Tests Review:  Lab Results   Component Value Date    WBC 10.8 2025    WBC 9.7 2025    WBC 11.9 (H) 2023    HGB 10.7 (L) 2025    
[facility]  [facility]  Progress Note      Chief Complaint   Patient presents with    Wound Check     Sent in to be admitted. Has DVT in right leg, on thinner. Now has developed cellulitis. Denies fever, has drainage to leg    Leg Pain       SUBJECTIVE:  Patient is tolerating medications. No reported adverse drug reactions.  No nausea, vomiting, diarrhea.  Doing okay.   Review of systems:  As stated above in the chief complaint, otherwise negative.    Medications:  Scheduled Meds:   sodium hypochlorite   Irrigation Daily    apixaban  2.5 mg Oral BID    cephALEXin  500 mg Oral 3 times per day    acetaminophen  650 mg Oral Q6H    levothyroxine  50 mcg Oral Daily    amiodarone  50 mg Oral Daily    ocuvite-lutein  1 tablet Oral Daily    vitamin B-12  500 mcg Oral Daily    vitamin D3  5,000 Units Oral Daily    aspirin  81 mg Oral Daily    docusate sodium  100 mg Oral BID     Continuous Infusions:  PRN Meds:oxyCODONE **OR** oxyCODONE, HYDROmorphone    OBJECTIVE:  /78   Pulse 94   Temp 97.6 °F (36.4 °C) (Temporal)   Resp 18   Ht 1.6 m (5' 2.99\")   Wt 77.1 kg (170 lb)   SpO2 94%   BMI 30.12 kg/m²   Temp  Av °F (36.7 °C)  Min: 97.5 °F (36.4 °C)  Max: 99.1 °F (37.3 °C)  Constitutional: The patient is awake, alert, and oriented.   Skin: Warm and dry. No rashes were noted.   HEENT:   Moist mucous membranes.  No ulcerations or thrush.  Neck: Supple to movements.   Chest: No use of accessory muscles to breathe. Symmetrical expansion.  Mild wheezing, crackles .  Cardiovascular: S1 and S2 are rhythmic and regular. + murmur appreciated.   Abdomen: Positive bowel sounds to auscultation. Benign to palpation. No masses felt. No hepatosplenomegaly.  Genitourinary: Female  Extremities: No clubbing, no cyanosis, + edema.  Right leg hematoma stablefor the puncture site noted  Lines: peripheral    Laboratory and Tests Review:  Lab Results   Component Value Date    WBC 8.0 2025    WBC 9.9 
[facility]  [facility]  Progress Note      Chief Complaint   Patient presents with    Wound Check     Sent in to be admitted. Has DVT in right leg, on thinner. Now has developed cellulitis. Denies fever, has drainage to leg    Leg Pain       SUBJECTIVE:  Patient is tolerating medications. No reported adverse drug reactions.  No nausea, vomiting, diarrhea.  Appears flush in the face today but no fevers  Family at bedside  Review of systems:  As stated above in the chief complaint, otherwise negative.    Medications:  Scheduled Meds:   apixaban  2.5 mg Oral BID    furosemide  20 mg IntraVENous Once    cephALEXin  500 mg Oral 3 times per day    acetaminophen  650 mg Oral Q6H    levothyroxine  50 mcg Oral Daily    amiodarone  50 mg Oral Daily    ocuvite-lutein  1 tablet Oral Daily    vitamin B-12  500 mcg Oral Daily    vitamin D3  5,000 Units Oral Daily    aspirin  81 mg Oral Daily    docusate sodium  100 mg Oral BID     Continuous Infusions:  PRN Meds:oxyCODONE **OR** oxyCODONE, HYDROmorphone    OBJECTIVE:  /65   Pulse 69   Temp 97.5 °F (36.4 °C) (Temporal)   Resp 17   Ht 1.6 m (5' 2.99\")   Wt 77.1 kg (170 lb)   SpO2 96%   BMI 30.12 kg/m²   Temp  Av.2 °F (36.8 °C)  Min: 97.5 °F (36.4 °C)  Max: 99.1 °F (37.3 °C)  Constitutional: The patient is awake, alert, and oriented.   Skin: Warm and dry. No rashes were noted.   HEENT: Round and reactive pupils.  Moist mucous membranes.  No ulcerations or thrush.  Neck: Supple to movements.   Chest: No use of accessory muscles to breathe. Symmetrical expansion.  Mild wheezing, crackles .  Cardiovascular: S1 and S2 are rhythmic and regular. No murmurs appreciated.   Abdomen: Positive bowel sounds to auscultation. Benign to palpation. No masses felt. No hepatosplenomegaly.  Genitourinary: Female  Extremities: No clubbing, no cyanosis, no edema.  Right leg hematoma stablefor the puncture site noted  Lines: peripheral    Laboratory and 
IV team ordered due to pt needing a type & screen for surgery. Lab and nurse had multiple attempts without success.   
Nurse to nurse report called to [facility]. All questions and concerns addressed with nurse receiving report. No further needs from this nurse needed. Iv removed without complication. Dc instructions and scripts placed in envelope of ambulance attendants    
Occupational Therapy    Date:2025  Patient Name: Sil Rascon  MRN: [id number]  : 1926  Room: [address] [place] [address]-A     OT orders received and chart reviewed. OT eval on hold at this time  5.5 INR.  OT will follow and re-attempt eval as appropriate at a later time/date.    Marichuy Guevara OTR/L; [id number]    
Patients dressing on her abdomen was changes as it was saturated with sanguineous drainage. 4X4 applied and secured with tape  
Physical Therapy  Physical Therapy Treatment       Name: Sil Rascon  : 1926  MRN: [id number]      Date of Service: 2025    Evaluating PT:  Grant Gordon PT, DPT  [id number]    Room #:  5419/5419-A  Diagnosis:  MAZIN (acute kidney injury) [N17.9]  Cellulitis of right leg [L03.115]  PMHx/PSHx:   has a past medical history of Acute on chronic heart failure with preserved ejection fraction (Formerly [facility]), MAZIN (acute kidney injury), Aortic stenosis, CKD (chronic kidney disease) stage 3, GFR 30-59 ml/min (Formerly [facility]), COVID-19, Hemopericardium, History of open heart surgery, History of transcatheter aortic valve replacement (TAVR), Iron deficiency, Pacemaker, PAF (paroxysmal atrial fibrillation) (Formerly [facility]), Postprocedural pneumothorax, Pulmonary hypertension (Formerly [facility]), Sepsis (Formerly [facility]), Severe aortic stenosis by prior echocardiogram, and UTI (urinary tract infection) with pyuria.  Procedure/Surgery:  none this admission   Precautions:  Falls, Alarms, [place]  Equipment Needs:  TBD    SUBJECTIVE:    Pt lives alone  in a 2 story home with 6 stairs to enter and 1 rail.  Bed is on 1 floor and bath is on 1 floor.  Pt ambulated with WW PTA.  Equipment Owned:     OBJECTIVE:   Initial Evaluation  Date: 6/3/25 Treatment Date: 25 Short Term/ Long Term   Goals   AM-PAC 6 Clicks     Was pt agreeable to Eval/treatment? Yes  yes    Does pt have pain? 10/10 pain in RLE during activity  2/10 pain BLE at rest RLE pain     Bed Mobility  Rolling: ModA  Supine to sit: ModA x2  Sit to supine: ModA x2  Scooting: MaxA Rolling: Jose  Supine to sit: Jose  Sit to supine: Jose  Scooting: Jose Rolling: SBA  Supine to sit: SBA  Sit to supine: SBA  Scooting: SBA   Transfers Sit to stand: NT- pain/ weakness  Stand to sit: NT  Stand pivot: NT Sit to stand: ModA  Stand to sit: ModA  Stand pivot: NT Sit to stand: SBA  Stand to sit: SBA  Stand pivot: SBA with WW   Ambulation    NT  Attempt - Unable to advance LLE to side step toward HOB with  WW with MaxAx2 50 feet 
Pt verified using 2 Identifiers and ID band with OR staff prior to acceptance to PACU/Phase II care.   
Renal Dose Adjustment Policy (Generic)     This patient is on medication that requires renal, weight, and/or indication dose adjustment.      Date Body Weight IBW  Adjusted BW SCr  CrCl Dialysis status   6/2/2025 77.1 kg (170 lb) Ideal body weight: 52.4 kg (115 lb 7.7 oz)  Adjusted ideal body weight: 62.3 kg (137 lb 4.6 oz) Serum creatinine: 1.6 mg/dL (H) 05/31/25 2059  Estimated creatinine clearance: 19 mL/min (A) N/a       Pharmacy has dose-adjusted the following medication(s):    Date Previous Order Adjusted Order   6/2/2025 Cefazolin 2 gram IV Q8H Cefazolin 2 gram IV q12h       These changes were made per protocol according to the [facility]   Automatic Renal Dose Adjustment Policy.     *Please note this dose may need readjusted if patient's condition changes.    Please contact pharmacy with any questions regarding these changes.    Ankur Lozano [facility]  6/2/2025  3:08 PM    
Subjective:    Chief complaint:    Doing well  No new issues  No problems overnight.  Denies chest pain, angina, and dyspnea.  Denies abdominal pain.  Tolerating diet.  No nausea or vomiting.    Objective:    /60   Pulse 64   Temp 97.6 °F (36.4 °C) (Temporal)   Resp 18   Ht 1.6 m (5' 2.99\")   Wt 77.1 kg (170 lb)   SpO2 94%   BMI 30.12 kg/m²   General : Awake ,alert,no distress.  Heart:  RRR, no murmurs, gallops, or rubs.  Lungs:  CTA bilaterally, no wheeze, rales or rhonchi  Abd: bowel sounds present, nontender, nondistended, no masses  Extrem:  No clubbing, cyanosis, right leg is bandaged    CBC:   Lab Results   Component Value Date/Time    WBC 9.9 06/06/2025 07:45 AM    RBC 3.78 06/06/2025 07:45 AM    HGB 10.9 06/06/2025 07:45 AM    HCT 33.6 06/06/2025 07:45 AM    MCV 88.9 06/06/2025 07:45 AM    MCH 28.8 06/06/2025 07:45 AM    MCHC 32.4 06/06/2025 07:45 AM    RDW 14.3 06/06/2025 07:45 AM     06/06/2025 07:45 AM    MPV 8.8 06/06/2025 07:45 AM     BMP:    Lab Results   Component Value Date/Time     06/06/2025 07:45 AM    K 4.0 06/06/2025 07:45 AM    K 3.2 06/04/2023 06:02 PM     06/06/2025 07:45 AM    CO2 22 06/06/2025 07:45 AM    BUN 34 06/06/2025 07:45 AM    CREATININE 1.1 06/06/2025 07:45 AM    CALCIUM 8.8 06/06/2025 07:45 AM    GFRAA 50 08/03/2022 04:40 AM    LABGLOM 44 06/06/2025 07:45 AM    LABGLOM 46 06/12/2023 02:30 AM    GLUCOSE 100 06/06/2025 07:45 AM     PT/INR:    Lab Results   Component Value Date/Time    PROTIME 18.3 06/03/2025 09:30 AM    INR 1.7 06/03/2025 09:30 AM     Troponin:  No results found for: \"TROPONINI\"    No results for input(s): \"LABURIN\" in the last 72 hours.  No results for input(s): \"BC\" in the last 72 hours.  No results for input(s): \"BLOODCULT2\" in the last 72 hours.      Current Facility-Administered Medications:     sodium hypochlorite (DAKINS) 0.25 % external solution, , Irrigation, Daily, Iveth Morgan,     furosemide (LASIX) injection 20 mg, 20 
Subjective:    Chief complaint:    Patient is feeling okay  Pain is improved with pain medications  No problems overnight.  Denies chest pain, angina, and dyspnea.  Denies abdominal pain.  Tolerating diet.  No nausea or vomiting.    Objective:    /63   Pulse 65   Temp 97.6 °F (36.4 °C) (Temporal)   Resp 18   Ht 1.6 m (5' 2.99\")   Wt 77.1 kg (170 lb)   SpO2 96%   BMI 30.12 kg/m²   General : Awake ,alert,no distress.  Heart:  RRR, no murmurs, gallops, or rubs.  Lungs:  CTA bilaterally, no wheeze, rales or rhonchi  Abd: bowel sounds present, nontender, nondistended, no masses  Extrem:  No clubbing, cyanosis, right leg is bandaged    CBC:   Lab Results   Component Value Date/Time    WBC 10.8 06/03/2025 09:30 AM    RBC 3.72 06/03/2025 09:30 AM    HGB 10.7 06/03/2025 09:30 AM    HCT 33.3 06/03/2025 09:30 AM    MCV 89.5 06/03/2025 09:30 AM    MCH 28.8 06/03/2025 09:30 AM    MCHC 32.1 06/03/2025 09:30 AM    RDW 14.2 06/03/2025 09:30 AM     06/03/2025 09:30 AM    MPV 8.9 06/03/2025 09:30 AM     BMP:    Lab Results   Component Value Date/Time     05/31/2025 08:59 PM    K 4.0 05/31/2025 08:59 PM    K 3.2 06/04/2023 06:02 PM     05/31/2025 08:59 PM    CO2 22 05/31/2025 08:59 PM    BUN 29 05/31/2025 08:59 PM    CREATININE 1.6 05/31/2025 08:59 PM    CALCIUM 9.0 05/31/2025 08:59 PM    GFRAA 50 08/03/2022 04:40 AM    LABGLOM 29 05/31/2025 08:59 PM    LABGLOM 46 06/12/2023 02:30 AM    GLUCOSE 117 05/31/2025 08:59 PM     PT/INR:    Lab Results   Component Value Date/Time    PROTIME 18.3 06/03/2025 09:30 AM    INR 1.7 06/03/2025 09:30 AM     Troponin:  No results found for: \"TROPONINI\"    No results for input(s): \"LABURIN\" in the last 72 hours.  No results for input(s): \"BC\" in the last 72 hours.  No results for input(s): \"BLOODCULT2\" in the last 72 hours.      Current Facility-Administered Medications:     ceFAZolin (ANCEF) 2,000 mg in sterile water 20 mL IV syringe, 2,000 mg, IntraVENous, Q12H, 
Subjective:    Chief complaint:    Very hard of hearing  Pain is better with pain medication  No problems overnight.  Denies chest pain, angina, and dyspnea.  Denies abdominal pain.  Tolerating diet.  No nausea or vomiting.    Objective:    /66   Pulse 66   Temp 97.5 °F (36.4 °C) (Oral)   Resp 17   Ht 1.6 m (5' 2.99\")   Wt 77.1 kg (170 lb)   SpO2 92%   BMI 30.12 kg/m²   General : Awake ,alert,no distress.  Heart:  RRR, no murmurs, gallops, or rubs.  Lungs:  CTA bilaterally, no wheeze, rales or rhonchi  Abd: bowel sounds present, nontender, nondistended, no masses  Extrem:  No clubbing, cyanosis, right leg is bandaged    CBC:   Lab Results   Component Value Date/Time    WBC 9.7 05/31/2025 08:59 PM    RBC 4.66 05/31/2025 08:59 PM    HGB 13.4 05/31/2025 08:59 PM    HCT 41.1 05/31/2025 08:59 PM    MCV 88.2 05/31/2025 08:59 PM    MCH 28.8 05/31/2025 08:59 PM    MCHC 32.6 05/31/2025 08:59 PM    RDW 14.0 05/31/2025 08:59 PM     05/31/2025 08:59 PM    MPV 9.0 05/31/2025 08:59 PM     BMP:    Lab Results   Component Value Date/Time     05/31/2025 08:59 PM    K 4.0 05/31/2025 08:59 PM    K 3.2 06/04/2023 06:02 PM     05/31/2025 08:59 PM    CO2 22 05/31/2025 08:59 PM    BUN 29 05/31/2025 08:59 PM    CREATININE 1.6 05/31/2025 08:59 PM    CALCIUM 9.0 05/31/2025 08:59 PM    GFRAA 50 08/03/2022 04:40 AM    LABGLOM 29 05/31/2025 08:59 PM    LABGLOM 46 06/12/2023 02:30 AM    GLUCOSE 117 05/31/2025 08:59 PM     PT/INR:    Lab Results   Component Value Date/Time    PROTIME 57.6 06/01/2025 05:54 PM    INR 5.2 06/01/2025 05:54 PM     Troponin:  No results found for: \"TROPONINI\"    No results for input(s): \"LABURIN\" in the last 72 hours.  No results for input(s): \"BC\" in the last 72 hours.  No results for input(s): \"BLOODCULT2\" in the last 72 hours.      Current Facility-Administered Medications:     levothyroxine (SYNTHROID) tablet 50 mcg, 50 mcg, Oral, Daily, Alejandro Amor MD, 50 mcg at 06/02/25 
Surgery bath given   
VASCULAR SURGERY PROGRESS NOTE    SUBJECTIVE  Pt seen/examined. No new events overnight. Pain somewhat improved.    CURRENT MEDICATIONS     HYDROcodone 5 mg - acetaminophen    ceFAZolin  2,000 mg IntraVENous Q12H    levothyroxine  50 mcg Oral Daily    amiodarone  50 mg Oral Daily    ocuvite-lutein  1 tablet Oral Daily    vitamin B-12  500 mcg Oral Daily    vitamin D3  5,000 Units Oral Daily    [Held by provider] aspirin  81 mg Oral Daily    docusate sodium  100 mg Oral BID        PHYSICAL EXAM   BP (!) 90/55   Pulse 65   Temp 97.6 °F (36.4 °C) (Temporal)   Resp 18   Ht 1.6 m (5' 2.99\")   Wt 77.1 kg (170 lb)   SpO2 98%   BMI 30.12 kg/m²     Intake/Output Summary (Last 24 hours) at 6/3/2025 1043  Last data filed at 6/3/2025 0855  Gross per 24 hour   Intake 480 ml   Output 500 ml   Net -20 ml        CONSTITUTIONAL:  awake, alert, cooperative, no apparent distress  NEURO:  Normal  EYES:  lids and lashes normal, sclera clear and conjunctiva normal  ENT:  normocepalic, without obvious abnormality  NECK:  supple, symmetrical, trachea midline  LUNGS:  no increased work of breathing  CARDIOVASCULAR:  regular rate and rhythm  ABDOMEN:  soft, non-distended, non-tender,   EXTREMITIES:   MAEx4. Atraumatic. RLE edema present, R PT 2+  R LE: hematoma present to posterior calf, puncture hole draining old blood    LABS    Lab Results   Component Value Date    WBC 10.8 06/03/2025    HGB 10.7 (L) 06/03/2025    HCT 33.3 (L) 06/03/2025     06/03/2025    PROTIME 18.3 (H) 06/03/2025    INR 1.7 06/03/2025    APTT 52.5 (H) 05/31/2025    K 4.0 05/31/2025    BUN 29 (H) 05/31/2025    CREATININE 1.6 (H) 05/31/2025       ASSESSMENT/PLAN  89 y.o. female with what seems to be right lower extremity hematoma     - ID following for possible cellulitis- on ancef- plan to transition to oral on discharge for total of 10 days   - Alginate, heavy drainage pack, kerlix and ACE to R LE - changed today  -Please elevate right lower 
VASCULAR SURGERY PROGRESS NOTE    SUBJECTIVE  Pt seen/examined. Pain controlled. No new events overnight.     CURRENT MEDICATIONS     oxyCODONE **OR** oxyCODONE, HYDROmorphone    acetaminophen  650 mg Oral Q6H    ceFAZolin  2,000 mg IntraVENous Q12H    levothyroxine  50 mcg Oral Daily    amiodarone  50 mg Oral Daily    ocuvite-lutein  1 tablet Oral Daily    vitamin B-12  500 mcg Oral Daily    vitamin D3  5,000 Units Oral Daily    [Held by provider] aspirin  81 mg Oral Daily    docusate sodium  100 mg Oral BID        PHYSICAL EXAM   /63   Pulse 69   Temp 98 °F (36.7 °C)   Resp 18   Ht 1.6 m (5' 2.99\")   Wt 77.1 kg (170 lb)   SpO2 93%   BMI 30.12 kg/m²     Intake/Output Summary (Last 24 hours) at 6/6/2025 1055  Last data filed at 6/6/2025 0829  Gross per 24 hour   Intake 180 ml   Output 0 ml   Net 180 ml        CONSTITUTIONAL:  awake, alert, cooperative, no apparent distress  NEURO:  Normal  EYES:  lids and lashes normal, sclera clear and conjunctiva normal  ENT:  normocepalic, without obvious abnormality  NECK:  supple, symmetrical, trachea midline  LUNGS:  no increased work of breathing  CARDIOVASCULAR:  regular rate and rhythm  ABDOMEN:  soft, non-distended, non-tender,   EXTREMITIES:   MAEx4. Atraumatic. RLE edema present, R PT 2+  R LE: hematoma present to posterior calf- wound- see photos in media    LABS    Lab Results   Component Value Date    WBC 9.9 06/06/2025    HGB 10.9 (L) 06/06/2025    HCT 33.6 (L) 06/06/2025     06/06/2025    PROTIME 18.3 (H) 06/03/2025    INR 1.7 06/03/2025    APTT 52.5 (H) 05/31/2025    K 4.0 06/06/2025    BUN 34 (H) 06/06/2025    CREATININE 1.1 (H) 06/06/2025       ASSESSMENT/PLAN  89 y.o. female with what seems to be right lower extremity hematoma s/p evacuation and debridement    - ID following for possible cellulitis- on ancef- plan to transition to oral on discharge for total of 10 days   - Alginate, heavy drainage pack, kerlix and ACE to R LE - changed 
VASCULAR SURGERY PROGRESS NOTE    SUBJECTIVE  Pt seen/examined. Pain controlled. No new events overnight.  Plan for discharge today to a nursing facility.     CURRENT MEDICATIONS     oxyCODONE **OR** oxyCODONE, HYDROmorphone    sodium hypochlorite   Irrigation Daily    apixaban  2.5 mg Oral BID    cephALEXin  500 mg Oral 3 times per day    acetaminophen  650 mg Oral Q6H    levothyroxine  50 mcg Oral Daily    amiodarone  50 mg Oral Daily    ocuvite-lutein  1 tablet Oral Daily    vitamin B-12  500 mcg Oral Daily    vitamin D3  5,000 Units Oral Daily    aspirin  81 mg Oral Daily    docusate sodium  100 mg Oral BID        PHYSICAL EXAM   BP 97/67   Pulse 70   Temp 97.5 °F (36.4 °C) (Temporal)   Resp 18   Ht 1.6 m (5' 2.99\")   Wt 77.1 kg (170 lb)   SpO2 93%   BMI 30.12 kg/m²     Intake/Output Summary (Last 24 hours) at 6/9/2025 0957  Last data filed at 6/9/2025 0752  Gross per 24 hour   Intake 600 ml   Output --   Net 600 ml        CONSTITUTIONAL:  awake, alert, cooperative, no apparent distress  NEURO:  Normal  EYES:  lids and lashes normal, sclera clear and conjunctiva normal  ENT:  normocepalic, without obvious abnormality  NECK:  supple, symmetrical, trachea midline  LUNGS:  no increased work of breathing  CARDIOVASCULAR:  regular rate and rhythm  ABDOMEN:  soft, non-distended, non-tender,   EXTREMITIES:   MAEx4. Atraumatic. RLE edema present, R PT 2+  R LE: wound present to posterior calf- dressing in place.  see photos in media    LABS    Lab Results   Component Value Date    WBC 8.0 06/07/2025    HGB 10.5 (L) 06/07/2025    HCT 32.3 (L) 06/07/2025     06/07/2025    PROTIME 18.3 (H) 06/03/2025    INR 1.7 06/03/2025    APTT 52.5 (H) 05/31/2025    K 4.2 06/07/2025    BUN 39 (H) 06/07/2025    CREATININE 1.2 (H) 06/07/2025       ASSESSMENT/PLAN  89 y.o. female with what seems to be right lower extremity hematoma s/p evacuation and debridement    -ID following for possible cellulitis- on ancef- plan to 
cellulitis, recommendations appreciated.   - Patient amenable to tubi compression dressing RLE this morning, applied at bedside.   -Please elevate right lower extremity  -Monitor wound for skin necrosis and tissue loss  - most recent INR 5.2 (5.1). continue to hold anticoagulation including lovenox  -No acute surgical interventions planned, okay for diet from our point of view   - Rest per primary    Will discuss w Dr. FLORIN Vasquez,   Surgery Resident PGY-3  6/2/2025  7:10 AM   
complications, and alternatives of the procedure.  They understand and consent.  All questions were answered    Wallace Denise MD      
diagnosis and results of clinical assessment     Frequency/Duration: 2-4 days/wk for 2 weeks PRN   Specific OT Treatment Interventions to include:   Instruction/training on adapted ADL techniques and AE recommendations to increase functional independence within precautions  Training on energy conservation strategies, correct breathing pattern and techniques to improve independence/tolerance for self-care routine  Functional transfer/mobility training/DME recommendations for increased independence, safety, and fall prevention  Patient/Family education to increase follow through with safety techniques and functional independence  Recommendation of environmental modifications for increased safety with functional transfers/mobility and ADLs  Cognitive retraining/development of therapeutic activities to improve problem solving, judgement, memory, and attention for increased safety/participation in ADL/IADL tasks  Therapeutic exercise to improve motor endurance, ROM, and functional strength for ADLs/functional transfers  Therapeutic activities to facilitate/challenge dynamic balance, stand tolerance for increased safety and independence with ADLs  Therapeutic activities to facilitate gross/fine motor skills for increased independence with ADLs  Positioning to improve skin integrity, interaction with environment and functional independence     Recommended Adaptive Equipment:  TBD      Home Living: Pt lives alone in a 2 story with 6 steps to enter with L HR.  B&B on main level.  Bathroom setup: tub/shower   Equipment owned: ww,      Prior Level of Function: assist with bathing (daughters) with ADLs & with IADLs; ambulated ww  Driving: NA  Occupation: retired     Pain Level: 2/10 at rest R LE; 10/10 with lt. Ax. R LE  Cognition: A&O: 4/4; Follows 2 step directions with cues (verbal & tactile)              Memory:  F              Sequencing:  F              Problem solving:  F              Judgement/safety:  P+             
heavy drainage pack, kerlix and ACE to R LE - changed today   -Please elevate right lower extremity  -Monitor wound for skin necrosis and tissue loss  - most recent INR 1.7. Continue to hold anticoagulation including lovenox   -consider restarting tomorrow   - Will discuss with Dr. Denise    Plan reviewed with Dr. Denise.     Yoanna Echevarria, DANIELLE - CNP    
negotiation: ascended and descended  NT NT 6 steps with SBA with 1 rail with no AD   ROM BUE:  Defer to OT  BLE:  Limited due to pain      Strength BUE:  Defer to OT  BLE:  3-/5 Limited due to Pain   Improve 1 MMT   Balance Sitting EOB:  ModA   Dynamic Standing:  NT Sitting EOB:  SBA   Dynamic Standing:  ModA x 2 WW Sitting EOB:  SBA  Dynamic Standing:  SBA with WW      Pt is A & O x 4  Sensation:  pt denies any numbness or tingling   Edema:  unremarkable     Vitals:  HR 75 spo2 97%  /75 seated    Patient education  Pt educated on role of PT    Patient response to education:   Pt verbalized understanding Pt demonstrated skill Pt requires further education in this area   Yes  NA No      ASSESSMENT:    Comments:  Pt agreeable to PT. Pt performing bed mobility with assist of trunk. Pt tolerated ~ 10 min EOB with cues for improved stability. Pt performed sit to stand transfer with assistance and cues. Pt unable to ambulate laterally due to pain and weakness. Pt required assist of trunk and BLE for sit to supine transfer.  Patient would benefit from continued skilled PT to maximize functional mobility independence.   Chair alarm activated  Treatment:  Patient practiced and was instructed in the following treatment:    Bed mobility- verbal cues to facilitate independence  Neuromuscular reeducation- verbal cues to facilitate independence  Functional transfers-Verbal cues for proper positioning and sequencing to perform transfers safely with maximum independence.       PLAN:    Patient is making good progress towards established goals.  Will continue with current POC.      Time in  1354  Time out  1417    Total Treatment Time  23 minutes     CPT codes:  [] Gait training 23551 0 minutes  [] Manual therapy 68897 0 minutes  [x] Therapeutic activities 40784 23 minutes  [] Therapeutic exercises 71215 0 minutes  [] Neuromuscular reeducation 48802 0 minutes    Grant Gordon, PT, DPT  [id number]    
wound for skin necrosis and tissue loss  - most recent INR 5.2 (5.1). continue to hold anticoagulation including lovenox  -No acute surgical interventions planned- will allow skin to declare itself  - okay for diet from our point of view      Pt seen and plan reviewed with Dr. Denise.       Yoanna Echevarria, DANIELLE - CNP    
      Assessment:    Principal Problem:    Cellulitis of right leg  Resolved Problems:    * No resolved hospital problems. *    Coumadin coagulopathy  Right lower extremity swelling/hematoma patient denies injury  Acute kidney injury  Hypertension  Coronary artery disease  Paroxysmal atrial fibrillation  Chronic kidney disease by history  Hypothyroidism  TAVR surgery for severe aortic stenosis  Pacemaker insertion post TAVR complicated by RV laceration and pericardial tamponade requiring pericardiocentesis  Plan:  INR 1.9 today  Will resume Eliquis  Since ultrasound of lower extremities negative for DVT I would continue Eliquis-given her age and creatinine 1.6 mood favor 2.5 mg dose  Discontinue warfarin  Case discussed with patient's nephew  Lan  Message left for sam Day  Plan on discharge subacute rehab      Alejandro Amor MD  11:50 AM  6/3/2025    NOTE: This report was transcribed using voice recognition software. Every effort was made to ensure accuracy; however, inadvertent transcription errors may be present    
   Functional Assessment:  AM-PAC Daily Activity Raw Score: 11/24    Initial Eval Status  Date: 6-3-25 Treatment Status  Date:6/5/25 STGs = LTGs  Time frame: 10-14 days   Feeding Min/SBA with noted spillage Min A  Bed level   Mod I/ Ind   Grooming Mod A Min A  Hygiene and grooming tasks seated at EOB   Supervision    UB Dressing Max A  gown management seated at EOB with mod A  Stand by Assist    LB Dressing Dep B socks  LB dressing task to don/doff socks with total A due to edema/wrap on RLE  Minimal Assist    Bathing Max A with sim. task Mod A   Simulated at EOB  Minimal Assist    Toileting Dep  purewick purewick  Minimal Assist    Bed Mobility  Logroll: Mod A  Supine to sit:   Mod Ax2  Sit to supine:   Mod A x2  Min  A  Bed mobility tasks with education on proper tech and body mechanics / UE placement sup to sit/ scooting at EOB/ mod A for sit to sup  Supine to sit: Stand by Assist   Sit to supine: Stand by Assist    Functional Transfers NT d/t R LE pain Unable due to pain in RLE/RN notified Minimal Assist    Functional Mobility NT d/t R LE pain  NT Minimal Assist    Balance Sitting:     Static:  Mod A    Dynamic:Mod A  Standing: NT Sit   S  Standing   Mod A +1      Activity Tolerance P+  F- F+/G   Visual/  Perceptual Glasses: no    WFL      Vitals EOB spO2 on RA 91%  HR 76 bpm  Post ax. spO2 on RA 88%, able to recover to 90%  with breathing tech.  HR 76 bpm  WFL WFL      Hand Dominance R    AROM (PROM) Strength Additional Info:    LYNDSAY SPANN shld. to 100  WFL distal Grossly 3+/5 good  and F- FMC/dexterity noted during ADL tasks      LUE L shld. to 100  WFL distal Grossly 3+/5 good  and F-  FMC/dexterity noted during ADL tasks             Education:  Pt was educated through out treatment regarding proper technique & safety with bed mobility, functional transfers & mobility, ADL compensatory strategies to ease tasks, improve safety & prevent falls to return home safely.      Comments: Collaboration with nursing 
  2. Unchanged interstitial prominence in both lungs.         XR CHEST PORTABLE   Final Result   1. Large left pleural effusion.   2. Increased central pulmonary venous markings.            CT ABDOMEN PELVIS WO CONTRAST Additional Contrast? None   Final Result   1.  Extensive diverticulosis seen in the colon more severe in the sigmoid   colon but there is no conspicuous signs for acute diverticulitis.  Cannot   exclude a component of mild constipation.      2.  There is no indication for acute intraperitoneal retroperitoneal process   in the abdomen the pelvis.      3 bilateral pleural effusions of mild degree on the right and of   mild-to-moderate degree on the right with some loculated left-sided pleural   effusion.  Can further evaluate with CT scan the chest.      There is         Vascular duplex lower extremity venous right   Final Result   No evidence of DVT in the right lower extremity.         XR TIBIA FIBULA RIGHT (2 VIEWS)   Final Result   No acute osseous abnormality.      Diffuse soft tissue edema.             Assessment:    Principal Problem:    Cellulitis of right leg  Resolved Problems:    * No resolved hospital problems. *    Coumadin coagulopathy  Right lower extremity swelling/hematoma patient denies injury  Acute kidney injury  Hypertension  Coronary artery disease  Paroxysmal atrial fibrillation  Chronic kidney disease by history  Hypothyroidism  TAVR surgery for severe aortic stenosis  Pacemaker insertion post TAVR complicated by RV laceration and pericardial tamponade requiring pericardiocentesis  Large left pleural effusion  Plan:  To rehab today    Alejandro Amor MD  10:14 AM  6/9/2025    NOTE: This report was transcribed using voice recognition software. Every effort was made to ensure accuracy; however, inadvertent transcription errors may be present    
Culture, Routine   Date Value Ref Range Status   06/04/2023   Final    10 to 100,000 CFU/mL  Mixed reid isolated. Further workup and sensitivity testing  is not routinely indicated and will not be performed.  Mixed reid isolated includes:  Mixed gram positive organisms  Gram negative rods     08/01/2022 <10,000 CFU/mL  Mixed gram positive organisms    Final     No results found for: \"MRSAC\"    ASSESSMENT:  Hematoma right ankle region  Cellulitis around the area improved    PLAN:  Continue cefazolin  debridement today  Check final cultures  Monitor labs    Xavier Card MD  12:17 PM  6/4/2025  
Final     No results found for: \"CXSURG\"  Urine Culture, Routine   Date Value Ref Range Status   06/04/2023   Final    10 to 100,000 CFU/mL  Mixed reid isolated. Further workup and sensitivity testing  is not routinely indicated and will not be performed.  Mixed reid isolated includes:  Mixed gram positive organisms  Gram negative rods     08/01/2022 <10,000 CFU/mL  Mixed gram positive organisms    Final     No results found for: \"MRSAC\"  5/31/2025 blood cultures no growth  6/4/2025 tissue cultures from the OR pending    ASSESSMENT:  Hematoma right ankle region  Cellulitis around the area improved  Leukocytosis resolved    PLAN:  Continue cefazolin  Surgery 6/4/2025 cultures are pending  Check final cultures  Monitor labs    Xavier Card MD  9:32 AM  6/5/2025  
found for: \"CXSURG\"  Urine Culture, Routine   Date Value Ref Range Status   06/04/2023   Final    10 to 100,000 CFU/mL  Mixed reid isolated. Further workup and sensitivity testing  is not routinely indicated and will not be performed.  Mixed reid isolated includes:  Mixed gram positive organisms  Gram negative rods     08/01/2022 <10,000 CFU/mL  Mixed gram positive organisms    Final     No results found for: \"MRSAC\"  5/31/2025 blood cultures no growth  6/4/2025 tissue cultures from the OR pending    ASSESSMENT:  Hematoma right ankle region  Cellulitis around the area improved  Leukocytosis resolved  Volume overload    PLAN:  Continue cephalexin  Surgery 6/4/2025 cultures are neg thus far  Pulmonary following for left effusion-possible thoracentesis  ultrasound rue due to swelling neg for dvt  Check final cultures  Monitor labs    DANIELLE Richardson - CNP  9:28 AM  6/8/2025  Pt seen and examined. Above discussed agree with advanced practice nurse. Labs, cultures, and radiographs reviewed.  Face to Face encounter occurred. Changes made as necessary.     MATTHEW JIMENEZ MD     
mild degree on the right and of   mild-to-moderate degree on the right with some loculated left-sided pleural   effusion.  Can further evaluate with CT scan the chest.      There is         Vascular duplex lower extremity venous right   Final Result   No evidence of DVT in the right lower extremity.         XR TIBIA FIBULA RIGHT (2 VIEWS)   Final Result   No acute osseous abnormality.      Diffuse soft tissue edema.             Assessment:    Principal Problem:    Cellulitis of right leg  Resolved Problems:    * No resolved hospital problems. *    Coumadin coagulopathy  Right lower extremity swelling/hematoma patient denies injury  Acute kidney injury  Hypertension  Coronary artery disease  Paroxysmal atrial fibrillation  Chronic kidney disease by history  Hypothyroidism  TAVR surgery for severe aortic stenosis  Pacemaker insertion post TAVR complicated by RV laceration and pericardial tamponade requiring pericardiocentesis  Plan:  Discussed with vascular  Eliquis resumption once okay with them  Increase activity as able  May need further I&D  Discharge to subacute rehab soon  Alejandro Amor MD  10:56 AM  6/5/2025    NOTE: This report was transcribed using voice recognition software. Every effort was made to ensure accuracy; however, inadvertent transcription errors may be present    
of mild constipation.      2.  There is no indication for acute intraperitoneal retroperitoneal process   in the abdomen the pelvis.      3 bilateral pleural effusions of mild degree on the right and of   mild-to-moderate degree on the right with some loculated left-sided pleural   effusion.  Can further evaluate with CT scan the chest.      There is         Vascular duplex lower extremity venous right   Final Result   No evidence of DVT in the right lower extremity.         XR TIBIA FIBULA RIGHT (2 VIEWS)   Final Result   No acute osseous abnormality.      Diffuse soft tissue edema.             Assessment:    Principal Problem:    Cellulitis of right leg  Resolved Problems:    * No resolved hospital problems. *    Coumadin coagulopathy  Right lower extremity swelling/hematoma patient denies injury  Acute kidney injury  Hypertension  Coronary artery disease  Paroxysmal atrial fibrillation  Chronic kidney disease by history  Hypothyroidism  TAVR surgery for severe aortic stenosis  Pacemaker insertion post TAVR complicated by RV laceration and pericardial tamponade requiring pericardiocentesis  Plan:  To PAYTON Amor MD  1:49 PM  6/6/2025    NOTE: This report was transcribed using voice recognition software. Every effort was made to ensure accuracy; however, inadvertent transcription errors may be present    
SPT   Grant Gordon PT, DPT   [id number]      
ease tasks, improve safety & prevent falls to return home safely.      Comments: Collaboration with nursing pt ok for therapy/ Upon arrival pt sup in bed . At end of session pt sup in bed / all lines and tubes intact, call light within reach. RN notified pt ^ pain in RLE seated at EOB /74 pt c/o dizziness at EOB     Pt has made fair progress towards set goals.   Continue with current plan of care      Treatment Time In 1320           Treatment Time Out: 1344          Treatment Charges: Mins Units   Ther Ex  70877     Manual Therapy 03849     Thera Activities 41123     ADL/Home Mgt 09740 9 1   Neuro Re-ed 32583 15 1   Group Therapy      Orthotic manage/training  94505     Non-Billable Time     Total Timed Treatment 24 2       Deangelo MCKINNON/MAGALIE [id number]          
independence & safety during completion of ADL/functional transfer/mobility tasks.  Pt would benefit from continued skilled OT to increase safety and independence with completion of ADL/IADL tasks for functional independence and quality of life.     Treatment: OT treatment provided this date includes:   Instruction/training on safety and adapted techniques for completion of ADLs: to increase [place] in self care   Instruction/training on energy conservation/work simplification for completion of ADLs: techniques to increase [place] with self care ADLs & iADLs, work simplification to improve endurance   Proper Positioning/Alignment: for optimal healing, skin integrity to prevent breakdown, decrease edema, TAPS  Skilled monitoring of vitals: to include BP, spO2 & HR during session  Sitting/standing Balance/Tolerance- to increased balance & activity tolerance during ADLs as well as facilitate proper posture and/or positioning.     Rehab Potential: Good for established goals     Patient / Family Goal: to regain strength       Patient and/or family were instructed on functional diagnosis, prognosis/goals and OT plan of care. Demonstrated F understanding.      Eval Complexity: Low     Time In: 7:45  Time Out: 8:15  Total Treatment Time: 15    Min Units   OT Eval Low 72362  x     OT Eval Medium 50168       OT Eval High 80475       OT Re-Eval 76572       Therapeutic Ex 57701       Therapeutic Activities 98199  15  1   ADL/Self Care 07861       Orthotic Management 33495       Manual 60338       Neuro Re-Ed 45865       Non-Billable Time          Evaluation Time additionally includes thorough review of current medical information, gathering information on past medical history/social history and prior level of function, interpretation of standardized testing/informal observation of tasks, assessment of data and development of plan of care and goals.        Dorita Boswell, OTR/L   License #  [id number]          
mobility, functional transfers & mobility, ADL compensatory strategies to ease tasks, improve safety & prevent falls to return home safely.      Comments: Collaboration with nursing pt ok for therapy/ Upon arrival pt sup in bed . At end of session pt sup in bed / all lines and tubes intact, call light within reach.     Pt has made fair progress towards set goals.   Continue with current plan of care      Treatment Time In 905         Treatment Time Out: 930          Treatment Charges: Mins Units   Ther Ex  64175     Manual Therapy 16593     Thera Activities 95310     ADL/Home Mgt 55805 10 1   Neuro Re-ed 94807 15 1   Group Therapy      Orthotic manage/training  67947     Non-Billable Time     Total Timed Treatment 25 2       Deangelo MCKINNON/MAGALIE [id number]          
was made to ensure accuracy; however, inadvertent transcription errors may be present

## 2025-06-09 NOTE — CARE COORDINATION
6/9/2025 social work transition of care planning  Pt plan is to [facility],once medically stable. Auth good til 6/10. Kath sent updated clinical to [facility] via [facility].  Electronically signed by SANDRINE Orellana on 6/9/2025 at 7:26 AM    Addendum: Kath set up pas ambulance for 10 am to [facility] for skilled rehab. Kath notified facility rep vis care port,pt's dtr via phone, and Floor rn.  Electronically signed by SANDRINE Orellana on 6/9/2025 at 8:44 AM

## 2025-06-12 NOTE — DISCHARGE INSTRUCTIONS
Visit Discharge/Physician Orders    Discharge condition: Stable    Assessment of pain at discharge:Assessed    Anesthetic used: lidocaine 4%     Discharge to:ECF     Left via:Private automobile    Accompanied by: daughters     ECF/A: [facility]     Dressing Orders:Cleanse wound to Right posterior leg with normal saline, apply ALGINATE AG  *DO NOT WET ALGINATE BEFORE REMOVING* to wound bed and cover with ABD pad and wrap with kerlix and coban from base of toes to base of knee- change daily     Keep wrap dry at all times. If wrap becomes wet, becomes painful or falls 2 inches- may remove wrap and replace dressing     Spandgrip on Left leg- On in morning and off at night      Treatment Orders: 6/18 Elevate legs throughout the day   6/18 No pressure to the wound  6/18 Dietitian to review chart for increased protein for wound healing    FOLLOW NUTRITIOUS DIET. CHOOSE FOODS HIGH IN PROTEIN -CHICKEN- FISH-AND EGGS,  CHOOSE FOODS HIGH IN VITAMIN C.   MULTIVITAMIN DAILY.        [facility] followup visit ________2 weeks _____________________  (Please note your next appointment above and if you are unable to keep, kindly give a 24 hour notice. Thank you.)    Physician signature:__________________________      If you experience any of the following, please call the Wound Care Center during business hours:    * Increase in Pain  * Temperature over 101  * Increase in drainage from your wound  * Drainage with a foul odor  * Bleeding  * Increase in swelling  * Need for compression bandage changes due to slippage, breakthrough drainage.    If you need medical attention outside of the business hours of the Wound Care Centers please contact your PCP or go to the nearest emergency room.

## 2025-06-18 ENCOUNTER — HOSPITAL ENCOUNTER (OUTPATIENT)
Dept: WOUND CARE | Age: 89
Discharge: HOME | End: 2025-06-18
Payer: MEDICARE

## 2025-06-18 VITALS
TEMPERATURE: 97 F | BODY MASS INDEX: 31.28 KG/M2 | DIASTOLIC BLOOD PRESSURE: 76 MMHG | SYSTOLIC BLOOD PRESSURE: 118 MMHG | WEIGHT: 170 LBS | HEIGHT: 62 IN | RESPIRATION RATE: 18 BRPM | HEART RATE: 92 BPM

## 2025-06-18 DIAGNOSIS — L97.212 SKIN ULCER OF RIGHT CALF WITH FAT LAYER EXPOSED (HCC): Primary | Chronic | ICD-10-CM

## 2025-06-18 PROCEDURE — 11042 DBRDMT SUBQ TIS 1ST 20SQCM/<: CPT

## 2025-06-18 PROCEDURE — 11042 DBRDMT SUBQ TIS 1ST 20SQCM/<: CPT | Performed by: SURGERY

## 2025-06-18 PROCEDURE — 99213 OFFICE O/P EST LOW 20 MIN: CPT

## 2025-06-18 RX ORDER — CLOBETASOL PROPIONATE 0.5 MG/G
OINTMENT TOPICAL PRN
OUTPATIENT
Start: 2025-06-18

## 2025-06-18 RX ORDER — GENTAMICIN SULFATE 1 MG/G
OINTMENT TOPICAL PRN
OUTPATIENT
Start: 2025-06-18

## 2025-06-18 RX ORDER — LIDOCAINE 40 MG/G
CREAM TOPICAL PRN
OUTPATIENT
Start: 2025-06-18

## 2025-06-18 RX ORDER — SILVER SULFADIAZINE 10 MG/G
CREAM TOPICAL PRN
OUTPATIENT
Start: 2025-06-18

## 2025-06-18 RX ORDER — LIDOCAINE HYDROCHLORIDE 20 MG/ML
JELLY TOPICAL PRN
OUTPATIENT
Start: 2025-06-18

## 2025-06-18 RX ORDER — TRIAMCINOLONE ACETONIDE 1 MG/G
OINTMENT TOPICAL PRN
OUTPATIENT
Start: 2025-06-18

## 2025-06-18 RX ORDER — MUPIROCIN 2 %
OINTMENT (GRAM) TOPICAL PRN
OUTPATIENT
Start: 2025-06-18

## 2025-06-18 RX ORDER — LIDOCAINE HYDROCHLORIDE 40 MG/ML
SOLUTION TOPICAL PRN
OUTPATIENT
Start: 2025-06-18

## 2025-06-18 RX ORDER — BETAMETHASONE DIPROPIONATE 0.5 MG/G
CREAM TOPICAL PRN
OUTPATIENT
Start: 2025-06-18

## 2025-06-18 RX ORDER — BACITRACIN ZINC 500 [USP'U]/G
OINTMENT TOPICAL PRN
OUTPATIENT
Start: 2025-06-18

## 2025-06-18 RX ORDER — BACITRACIN ZINC AND POLYMYXIN B SULFATE 500; 1000 [USP'U]/G; [USP'U]/G
OINTMENT TOPICAL PRN
OUTPATIENT
Start: 2025-06-18

## 2025-06-18 RX ORDER — NEOMYCIN/BACITRACIN/POLYMYXINB 3.5-400-5K
OINTMENT (GRAM) TOPICAL PRN
OUTPATIENT
Start: 2025-06-18

## 2025-06-18 RX ORDER — SODIUM CHLOR/HYPOCHLOROUS ACID 0.033 %
SOLUTION, IRRIGATION IRRIGATION PRN
OUTPATIENT
Start: 2025-06-18

## 2025-06-18 RX ORDER — LIDOCAINE 50 MG/G
OINTMENT TOPICAL PRN
OUTPATIENT
Start: 2025-06-18

## 2025-06-18 ASSESSMENT — PAIN DESCRIPTION - ORIENTATION: ORIENTATION: RIGHT

## 2025-06-18 ASSESSMENT — PAIN DESCRIPTION - LOCATION: LOCATION: LEG

## 2025-06-18 ASSESSMENT — PAIN DESCRIPTION - DESCRIPTORS: DESCRIPTORS: ACHING;BURNING

## 2025-06-18 ASSESSMENT — PAIN SCALES - GENERAL: PAINLEVEL_OUTOF10: 4

## 2025-06-18 ASSESSMENT — PAIN DESCRIPTION - ONSET: ONSET: ON-GOING

## 2025-06-18 ASSESSMENT — PAIN - FUNCTIONAL ASSESSMENT: PAIN_FUNCTIONAL_ASSESSMENT: PREVENTS OR INTERFERES SOME ACTIVE ACTIVITIES AND ADLS

## 2025-06-18 ASSESSMENT — PAIN DESCRIPTION - PAIN TYPE: TYPE: CHRONIC PAIN

## 2025-06-18 ASSESSMENT — PAIN DESCRIPTION - FREQUENCY: FREQUENCY: INTERMITTENT

## 2025-06-18 NOTE — PLAN OF CARE
Problem: ABCDS Injury Assessment  Goal: Absence of physical injury  Outcome: Progressing     Problem: Pain  Goal: Verbalizes/displays adequate comfort level or baseline comfort level  Outcome: Progressing     Problem: Wound:  Goal: Will show signs of wound healing; wound closure and no evidence of infection  Description: Will show signs of wound healing; wound closure and no evidence of infection  Reactivated     Problem: Compression therapy:  Goal: Will be free from complications associated with compression therapy  Description: Will be free from complications associated with compression therapy  Reactivated

## 2025-06-18 NOTE — PROGRESS NOTES
Wound Healing Center Followup Visit Note    Referring Physician : Gene Flores DO  Sil Rascon  MEDICAL RECORD NUMBER:  [id number]  AGE: 89 y.o.   GENDER: female  : 1926  EPISODE DATE:  2025    Subjective:     Chief Complaint   Patient presents with    Wound Check     Right leg      HISTORY of PRESENT ILLNESS HPI   Sil Rascon is a 89 y.o. female who presents today in regards to follow up evaluation and treatment of wound/ulcer.  That patient's past medical, family and social hx were reviewed and changes were made if present.    History of Wound Context:  Patient presents in re to R Calf wound.      Pt was switched to coumadin after outpt radiology service did US at her home noting DVT.  Report was given to her primary who stopped her eliquis and started her on coumadin and lovenox injections till INR was tx  - as concern for eliquis failure.  She was on eliquis previously for afib.       She had gone to OSH after injuring her leg and hematoma was first noted 25.  They did do R LE us noting no evidence of DVT.       She came back as hematoma was worsening.      25 she underwent evacuation of hematoma, debridement of wound.      Her wound has improved since admission.  She has not had similar wounds in the past.  She is currently admitted at nursing home in [place].      25  Overall wound improving  Aquacell ag, abdominal pad, prestonlexclarice    Wound/Ulcer Pain Timing/Severity: waxing and waning, moderate  Quality of pain: aching, throbbing, shooting, tender  Severity:  5 / 10   Modifying Factors: Pain worsens with debridement dressing changes  Associated Signs/Symptoms: edema, drainage, and pain    Ulcer Identification:  Ulcer Type: traumatic  Contributing Factors: edema    Diabetic/Pressure/Non Pressure Ulcers only:  Ulcer: Non-Pressure ulcer, fat layer exposed    Wound: Contusion        PAST MEDICAL HISTORY      Diagnosis Date    Acute on chronic heart failure with preserved

## 2025-06-30 NOTE — DISCHARGE INSTRUCTIONS
Visit Discharge/Physician Orders     Discharge condition: Stable     Assessment of pain at discharge:Assessed     Anesthetic used: lidocaine 4%      Discharge to:ECF      Left via:Private automobile     Accompanied by: daughters      ECF/HHA: [facility] *New order*      Dressing Orders:Cleanse wound to Right posterior leg with normal saline, apply Alginate Ag to wound bed and cover with ABD pad and wrap with kerlix and coban from base of toes to base of knee- change every daily.    Keep wrap dry at all times. If wrap becomes wet, becomes painful or falls 2 inches- may remove wrap and replace dressing      Spandgrip on Left leg- On in morning and off at night       Treatment Orders: 6/18 Elevate legs throughout the day   6/18 No pressure to the wound  6/18 Dietitian to review chart for increased protein for wound healing    FOLLOW NUTRITIOUS DIET. CHOOSE FOODS HIGH IN PROTEIN -CHICKEN- FISH-AND EGGS,  CHOOSE FOODS HIGH IN VITAMIN C.   MULTIVITAMIN DAILY.          [facility] followup visit ________2 weeks Dr. CATHERINE _____________________  (Please note your next appointment above and if you are unable to keep, kindly give a 24 hour notice. Thank you.)     Physician signature:__________________________        If you experience any of the following, please call the Wound Care Center during business hours:     * Increase in Pain  * Temperature over 101  * Increase in drainage from your wound  * Drainage with a foul odor  * Bleeding  * Increase in swelling  * Need for compression bandage changes due to slippage, breakthrough drainage.     If you need medical attention outside of the business hours of the Wound Care Centers please contact your PCP or go to the nearest emergency room.

## 2025-07-02 ENCOUNTER — HOSPITAL ENCOUNTER (OUTPATIENT)
Dept: WOUND CARE | Age: 89
Discharge: HOME | End: 2025-07-02
Payer: MEDICARE

## 2025-07-02 VITALS
SYSTOLIC BLOOD PRESSURE: 110 MMHG | HEART RATE: 64 BPM | WEIGHT: 170 LBS | DIASTOLIC BLOOD PRESSURE: 71 MMHG | HEIGHT: 62 IN | TEMPERATURE: 97.7 F | RESPIRATION RATE: 16 BRPM | BODY MASS INDEX: 31.28 KG/M2

## 2025-07-02 DIAGNOSIS — L97.212 SKIN ULCER OF RIGHT CALF WITH FAT LAYER EXPOSED (HCC): Primary | Chronic | ICD-10-CM

## 2025-07-02 PROCEDURE — 11042 DBRDMT SUBQ TIS 1ST 20SQCM/<: CPT

## 2025-07-02 PROCEDURE — 11042 DBRDMT SUBQ TIS 1ST 20SQCM/<: CPT | Performed by: SURGERY

## 2025-07-02 RX ORDER — MUPIROCIN 2 %
OINTMENT (GRAM) TOPICAL PRN
OUTPATIENT
Start: 2025-07-02

## 2025-07-02 RX ORDER — CLOBETASOL PROPIONATE 0.5 MG/G
OINTMENT TOPICAL PRN
OUTPATIENT
Start: 2025-07-02

## 2025-07-02 RX ORDER — SILVER SULFADIAZINE 10 MG/G
CREAM TOPICAL PRN
OUTPATIENT
Start: 2025-07-02

## 2025-07-02 RX ORDER — LIDOCAINE 50 MG/G
OINTMENT TOPICAL PRN
OUTPATIENT
Start: 2025-07-02

## 2025-07-02 RX ORDER — TRIAMCINOLONE ACETONIDE 1 MG/G
OINTMENT TOPICAL PRN
OUTPATIENT
Start: 2025-07-02

## 2025-07-02 RX ORDER — BACITRACIN ZINC AND POLYMYXIN B SULFATE 500; 1000 [USP'U]/G; [USP'U]/G
OINTMENT TOPICAL PRN
OUTPATIENT
Start: 2025-07-02

## 2025-07-02 RX ORDER — LIDOCAINE HYDROCHLORIDE 40 MG/ML
SOLUTION TOPICAL PRN
Status: DISCONTINUED | OUTPATIENT
Start: 2025-07-02 | End: 2025-07-03 | Stop reason: HOSPADM

## 2025-07-02 RX ORDER — BETAMETHASONE DIPROPIONATE 0.5 MG/G
CREAM TOPICAL PRN
OUTPATIENT
Start: 2025-07-02

## 2025-07-02 RX ORDER — NEOMYCIN/BACITRACIN/POLYMYXINB 3.5-400-5K
OINTMENT (GRAM) TOPICAL PRN
OUTPATIENT
Start: 2025-07-02

## 2025-07-02 RX ORDER — LIDOCAINE 40 MG/G
CREAM TOPICAL PRN
OUTPATIENT
Start: 2025-07-02

## 2025-07-02 RX ORDER — GENTAMICIN SULFATE 1 MG/G
OINTMENT TOPICAL PRN
OUTPATIENT
Start: 2025-07-02

## 2025-07-02 RX ORDER — LIDOCAINE HYDROCHLORIDE 40 MG/ML
SOLUTION TOPICAL PRN
OUTPATIENT
Start: 2025-07-02

## 2025-07-02 RX ORDER — SODIUM CHLOR/HYPOCHLOROUS ACID 0.033 %
SOLUTION, IRRIGATION IRRIGATION PRN
OUTPATIENT
Start: 2025-07-02

## 2025-07-02 RX ORDER — LIDOCAINE HYDROCHLORIDE 20 MG/ML
JELLY TOPICAL PRN
OUTPATIENT
Start: 2025-07-02

## 2025-07-02 RX ORDER — BACITRACIN ZINC 500 [USP'U]/G
OINTMENT TOPICAL PRN
OUTPATIENT
Start: 2025-07-02

## 2025-07-02 RX ADMIN — LIDOCAINE HYDROCHLORIDE 10 ML: 40 SOLUTION TOPICAL at 11:13

## 2025-07-02 NOTE — DISCHARGE INSTR - COC
Continuity of Care Form    Patient Name: Sil Rascon   :  1926  MRN:  [id number]      Wound Care Documentation and Therapy:  Wound 25 Leg Right;Lower;Posterior #1 (Active)   Wound Image   25 1008   Wound Etiology Venous 25 1008   Dressing Status New dressing applied 25 1105   Wound Cleansed Cleansed with saline 25 1105   Dressing/Treatment Alginate with Ag;ABD;Roll gauze 25 1105   Wound Length (cm) 7 cm 25 1109   Wound Width (cm) 6.4 cm 25 1109   Wound Depth (cm) 0.3 cm 25 1109   Wound Surface Area (cm^2) 44.8 cm^2 25 1109   Change in Wound Size % (l*w) -3.23 25 1109   Wound Volume (cm^3) 13.44 cm^3 25 1109   Wound Healing % 38 25 1109   Post-Procedure Length (cm) 7 cm 25 1120   Post-Procedure Width (cm) 6.4 cm 25 1120   Post-Procedure Depth (cm) 0.4 cm 25 1120   Post-Procedure Surface Area (cm^2) 44.8 cm^2 25 1120   Post-Procedure Volume (cm^3) 17.92 cm^3 25 1120   Undermining Starts ___ O'Clock 6 25 1008   Undermining Ends___ O'Clock 1 25 1008   Undermining Maxium Distance (cm) 1.5@1 25 1008   Wound Assessment Granulation tissue;Fibrin;Slough;Pink/red 25 1109   Drainage Amount Moderate (25-50%) 25 110   Drainage Description Serosanguinous;Yellow 25 1109   Odor None 25 110   Gina-wound Assessment Fragile;Blanchable erythema 25 110   Margins Attached edges 25 1008   Wound Thickness Description not for Pressure Injury Full thickness 25 1008   Number of days: 14        E

## 2025-07-02 NOTE — PROGRESS NOTES
debrided down through and including the removal of epidermis, dermis, and subcutaneous tissue.        Devitalized Tissue Debrided:  fibrin, biofilm, slough, and exudate to stimulate bleeding to promote healing, post debridement good bleeding base and wound edges noted    Wound/Ulcer #: 1    Percent of Wound/Ulcer Debrided: 50%    Total Surface Area Debrided:  20 sq cm     Estimated Blood Loss:  Minimal  Hemostasis Achieved:  by pressure    Procedural Pain:  9  / 10   Post Procedural Pain:  7 / 10     Response to treatment:  With complaints of pain.     Plan:   Treatment Note please see attached Discharge Instructions    Written patient dismissal instructions given to patient and signed by patient or POA.         Discharge Instructions         Visit Discharge/Physician Orders     Discharge condition: Stable     Assessment of pain at discharge:Assessed     Anesthetic used: lidocaine 4%      Discharge to:ECF      Left via:Private automobile     Accompanied by: baldomero      ECF/HHA: [facility]      Dressing Orders:Cleanse wound to Right posterior leg with normal saline, apply ALGINATE AG  *DO NOT WET ALGINATE BEFORE REMOVING* to wound bed and cover with ABD pad and wrap with kerlix and coban from base of toes to base of knee- change daily      Keep wrap dry at all times. If wrap becomes wet, becomes painful or falls 2 inches- may remove wrap and replace dressing      Spandgrip on Left leg- On in morning and off at night       Treatment Orders: 6/18 Elevate legs throughout the day   6/18 No pressure to the wound  6/18 Dietitian to review chart for increased protein for wound healing    FOLLOW NUTRITIOUS DIET. CHOOSE FOODS HIGH IN PROTEIN -CHICKEN- FISH-AND EGGS,  CHOOSE FOODS HIGH IN VITAMIN C.   MULTIVITAMIN DAILY.          [facility] followup visit ________2 weeks _____________________  (Please note your next appointment above and if you are unable to keep, kindly give a 24 hour notice. Thank you.)     Physician

## 2025-07-10 NOTE — DISCHARGE INSTRUCTIONS
Visit Discharge/Physician Orders     Discharge condition: Stable     Assessment of pain at discharge:Assessed     Anesthetic used: lidocaine 4%      Discharge to:ECF      Left via:Private automobile     Accompanied by: daughters      ECF/HHA: [facility] *New order*      Dressing Orders:Cleanse wound to Right posterior leg with normal saline, apply Drawtex (if unable to obtain drawtex may continue to use alginate ag- use double layer)  to wound bed and cover with ABD pad and wrap with kerlix and coban from base of toes to base of knee- change every daily.    Keep wrap dry at all times. If wrap becomes wet, becomes painful or falls 2 inches- may remove wrap and replace dressing      Spandgrip on Left leg- On in morning and off at night       Treatment Orders: 6/18 Elevate legs throughout the day   6/18 No pressure to the wound  6/18 Dietitian to review chart for increased protein for wound healing    FOLLOW NUTRITIOUS DIET. CHOOSE FOODS HIGH IN PROTEIN -CHICKEN- FISH-AND EGGS,  CHOOSE FOODS HIGH IN VITAMIN C.   MULTIVITAMIN DAILY.          [facility] followup visit ________2 weeks Dr. CATHERINE _____________________  (Please note your next appointment above and if you are unable to keep, kindly give a 24 hour notice. Thank you.)     Physician signature:__________________________        If you experience any of the following, please call the Wound Care Center during business hours:     * Increase in Pain  * Temperature over 101  * Increase in drainage from your wound  * Drainage with a foul odor  * Bleeding  * Increase in swelling  * Need for compression bandage changes due to slippage, breakthrough drainage.     If you need medical attention outside of the business hours of the Wound Care Centers please contact your PCP or go to the nearest emergency room.

## 2025-07-16 ENCOUNTER — HOSPITAL ENCOUNTER (OUTPATIENT)
Dept: WOUND CARE | Age: 89
Discharge: HOME | End: 2025-07-16
Payer: MEDICARE

## 2025-07-16 VITALS
HEART RATE: 87 BPM | BODY MASS INDEX: 31.28 KG/M2 | TEMPERATURE: 96.1 F | RESPIRATION RATE: 16 BRPM | HEIGHT: 62 IN | SYSTOLIC BLOOD PRESSURE: 111 MMHG | WEIGHT: 170 LBS | DIASTOLIC BLOOD PRESSURE: 77 MMHG

## 2025-07-16 DIAGNOSIS — L97.212 SKIN ULCER OF RIGHT CALF WITH FAT LAYER EXPOSED (HCC): Primary | ICD-10-CM

## 2025-07-16 PROCEDURE — 11042 DBRDMT SUBQ TIS 1ST 20SQCM/<: CPT | Performed by: SURGERY

## 2025-07-16 PROCEDURE — 11042 DBRDMT SUBQ TIS 1ST 20SQCM/<: CPT

## 2025-07-16 RX ORDER — BETAMETHASONE DIPROPIONATE 0.5 MG/G
CREAM TOPICAL PRN
OUTPATIENT
Start: 2025-07-16

## 2025-07-16 RX ORDER — SODIUM CHLOR/HYPOCHLOROUS ACID 0.033 %
SOLUTION, IRRIGATION IRRIGATION PRN
OUTPATIENT
Start: 2025-07-16

## 2025-07-16 RX ORDER — LIDOCAINE HYDROCHLORIDE 20 MG/ML
JELLY TOPICAL PRN
OUTPATIENT
Start: 2025-07-16

## 2025-07-16 RX ORDER — BACITRACIN ZINC 500 [USP'U]/G
OINTMENT TOPICAL PRN
OUTPATIENT
Start: 2025-07-16

## 2025-07-16 RX ORDER — TRIAMCINOLONE ACETONIDE 1 MG/G
OINTMENT TOPICAL PRN
OUTPATIENT
Start: 2025-07-16

## 2025-07-16 RX ORDER — BACITRACIN ZINC AND POLYMYXIN B SULFATE 500; 1000 [USP'U]/G; [USP'U]/G
OINTMENT TOPICAL PRN
OUTPATIENT
Start: 2025-07-16

## 2025-07-16 RX ORDER — LIDOCAINE 40 MG/G
CREAM TOPICAL PRN
OUTPATIENT
Start: 2025-07-16

## 2025-07-16 RX ORDER — LIDOCAINE HYDROCHLORIDE 40 MG/ML
SOLUTION TOPICAL PRN
Status: DISCONTINUED | OUTPATIENT
Start: 2025-07-16 | End: 2025-07-17 | Stop reason: HOSPADM

## 2025-07-16 RX ORDER — NEOMYCIN/BACITRACIN/POLYMYXINB 3.5-400-5K
OINTMENT (GRAM) TOPICAL PRN
OUTPATIENT
Start: 2025-07-16

## 2025-07-16 RX ORDER — CLOBETASOL PROPIONATE 0.5 MG/G
OINTMENT TOPICAL PRN
OUTPATIENT
Start: 2025-07-16

## 2025-07-16 RX ORDER — GENTAMICIN SULFATE 1 MG/G
OINTMENT TOPICAL PRN
OUTPATIENT
Start: 2025-07-16

## 2025-07-16 RX ORDER — LIDOCAINE 50 MG/G
OINTMENT TOPICAL PRN
OUTPATIENT
Start: 2025-07-16

## 2025-07-16 RX ORDER — FUROSEMIDE 20 MG/1
20 TABLET ORAL DAILY
COMMUNITY

## 2025-07-16 RX ORDER — MUPIROCIN 2 %
OINTMENT (GRAM) TOPICAL PRN
OUTPATIENT
Start: 2025-07-16

## 2025-07-16 RX ORDER — SILVER SULFADIAZINE 10 MG/G
CREAM TOPICAL PRN
OUTPATIENT
Start: 2025-07-16

## 2025-07-16 RX ORDER — LIDOCAINE HYDROCHLORIDE 40 MG/ML
SOLUTION TOPICAL PRN
OUTPATIENT
Start: 2025-07-16

## 2025-07-16 RX ADMIN — LIDOCAINE HYDROCHLORIDE: 40 SOLUTION TOPICAL at 11:04

## 2025-07-16 NOTE — PROGRESS NOTES
Alcohol use: Never    Drug use: Never     No Known Allergies  Current Outpatient Medications on File Prior to Encounter   Medication Sig Dispense Refill    furosemide (LASIX) 20 MG tablet Take 1 tablet by mouth daily      apixaban (ELIQUIS) 2.5 MG TABS tablet Take 1 tablet by mouth 2 times daily 60 tablet 0    aspirin 81 MG EC tablet Take 1 tablet by mouth daily      docusate sodium (COLACE) 100 MG capsule Take 1 capsule by mouth 2 times daily Family unsure of dose.      albuterol sulfate HFA (PROVENTIL;VENTOLIN;PROAIR) 108 (90 Base) MCG/ACT inhaler Inhale 2 puffs into the lungs 4 times daily for 10 days 18 g 0    levothyroxine (SYNTHROID) 50 MCG tablet Take 1 tablet by mouth Daily      amiodarone (PACERONE) 100 MG tablet Take 0.5 tablets by mouth daily      Multiple Vitamins-Minerals (OCUVITE ADULT 50+) CAPS Take 1 capsule by mouth daily      vitamin B-12 (CYANOCOBALAMIN) 500 MCG tablet Take 1 tablet by mouth daily      Cholecalciferol (VITAMIN D3) 125 MCG (5000 UT) TABS Take 1 tablet by mouth daily       No current facility-administered medications on file prior to encounter.       REVIEW OF SYSTEMS See HPI    Objective:    /77   Pulse 87   Temp (!) 96.1 °F (35.6 °C) (Temporal)   Resp 16   Ht 1.575 m (5' 2\")   Wt 77.1 kg (170 lb)   BMI 31.09 kg/m²   Wt Readings from Last 3 Encounters:   07/16/25 77.1 kg (170 lb)   07/02/25 77.1 kg (170 lb)   06/18/25 77.1 kg (170 lb)     PHYSICAL EXAM  CONSTITUTIONAL:   Awake, alert, cooperative   EYES:  lids and lashes normal   ENT: external ears and nose without lesions   NECK:  supple, symmetrical, trachea midline   SKIN:  Open wound Present    Assessment:     Problem List Items Addressed This Visit       Skin ulcer of right calf with fat layer exposed (HCC) - Primary (Chronic)    Relevant Medications    lidocaine (XYLOCAINE) 4 % external solution    Other Relevant Orders    Initiate Outpatient Wound Care Protocol    Initiate Oxygen Therapy Protocol       Pre

## 2025-07-16 NOTE — DISCHARGE INSTR - COC
Continuity of Care Form    Patient Name: Sil Rascon   :  1926  MRN:  [id number]    date:  2025   Iso      Nurse Assessment:  Last Vital Signs: /77   Pulse 87   Temp (!) 96.1 °F (35.6 °C) (Temporal)   Resp 16   Ht 1.575 m (5' 2\")   Wt 77.1 kg (170 lb)   BMI 31.09 kg/m²       Wound Care Documentation and Therapy:  Wound 25 Leg Right;Lower;Posterior #1 (Active)   Wound Image   25 1008   Wound Etiology Venous 25 1008   Dressing Status New dressing applied 25 1155   Wound Cleansed Cleansed with saline 25 1155   Dressing/Treatment Roll gauze 25 1155   Wound Length (cm) 6.9 cm 25 1104   Wound Width (cm) 5.7 cm 25 1104   Wound Depth (cm) 0.2 cm 25 1104   Wound Surface Area (cm^2) 39.33 cm^2 25 1104   Change in Wound Size % (l*w) 9.38 25 1104   Wound Volume (cm^3) 7.866 cm^3 25 1104   Wound Healing % 64 25 1104   Post-Procedure Length (cm) 7 cm 25 1138   Post-Procedure Width (cm) 5.8 cm 25 1138   Post-Procedure Depth (cm) 0.3 cm 25 1138   Post-Procedure Surface Area (cm^2) 40.6 cm^2 25 1138   Post-Procedure Volume (cm^3) 12.18 cm^3 25 1138   Undermining Starts ___ O'Clock 6 25 1008   Undermining Ends___ O'Clock 1 25 1008   Undermining Maxium Distance (cm) 1.5@1 25 1008   Wound Assessment Granulation tissue 25 1104   Drainage Amount Moderate (25-50%) 25 1104   Drainage Description Serosanguinous 25 1104   Odor None 25 1104   Gina-wound Assessment Fragile;Blanchable erythema 25 1104   Margins Attached edges 25 1008   Wound Thickness Description not for Pressure Injury Full thickness 25 1008   Number of days: 28

## 2025-07-24 NOTE — DISCHARGE INSTRUCTIONS
Visit Discharge/Physician Orders     Discharge condition: Stable     Assessment of pain at discharge:Assessed     Anesthetic used: lidocaine 4%      Discharge to:ECF      Left via:Private automobile     Accompanied by: daughters      ECF/[facility]: [facility] *New order*      Dressing Orders:Cleanse wound to Right posterior leg with normal saline, apply calcium alginate ag to wound bed and cover with ABD pad and wrap with kerlix and coban from base of toes to base of knee- change every daily.      Keep wrap dry at all times. If wrap becomes wet, becomes painful or falls 2 inches- may remove wrap and replace dressing      Spandgrip on Left leg- On in morning and off at night       Treatment Orders: 6/18 Elevate legs throughout the day   6/18 No pressure to the wound  6/18 Dietitian to review chart for increased protein for wound healing    FOLLOW NUTRITIOUS DIET. CHOOSE FOODS HIGH IN PROTEIN -CHICKEN- FISH-AND EGGS,  CHOOSE FOODS HIGH IN VITAMIN C.   MULTIVITAMIN DAILY.          [facility] followup visit ________2 weeks Dr. CATHERINE _____________________  (Please note your next appointment above and if you are unable to keep, kindly give a 24 hour notice. Thank you.)     Physician signature:__________________________        If you experience any of the following, please call the Wound Care Center during business hours:     * Increase in Pain  * Temperature over 101  * Increase in drainage from your wound  * Drainage with a foul odor  * Bleeding  * Increase in swelling  * Need for compression bandage changes due to slippage, breakthrough drainage.     If you need medical attention outside of the business hours of the Wound Care Centers please contact your PCP or go to the nearest emergency room.

## 2025-07-30 ENCOUNTER — HOSPITAL ENCOUNTER (OUTPATIENT)
Dept: WOUND CARE | Age: 89
Discharge: HOME | End: 2025-07-30
Payer: MEDICARE

## 2025-07-30 VITALS
TEMPERATURE: 97.4 F | WEIGHT: 168 LBS | DIASTOLIC BLOOD PRESSURE: 64 MMHG | SYSTOLIC BLOOD PRESSURE: 107 MMHG | HEIGHT: 62 IN | HEART RATE: 71 BPM | BODY MASS INDEX: 30.91 KG/M2 | RESPIRATION RATE: 14 BRPM

## 2025-07-30 DIAGNOSIS — L97.212 SKIN ULCER OF RIGHT CALF WITH FAT LAYER EXPOSED (HCC): Primary | ICD-10-CM

## 2025-07-30 PROBLEM — N17.9 AKI (ACUTE KIDNEY INJURY): Status: RESOLVED | Noted: 2022-08-01 | Resolved: 2025-07-30

## 2025-07-30 PROBLEM — N39.0 UTI (URINARY TRACT INFECTION) WITH PYURIA: Status: RESOLVED | Noted: 2022-08-01 | Resolved: 2025-07-30

## 2025-07-30 PROBLEM — L03.115 CELLULITIS OF RIGHT LEG: Status: RESOLVED | Noted: 2025-05-31 | Resolved: 2025-07-30

## 2025-07-30 PROCEDURE — 11042 DBRDMT SUBQ TIS 1ST 20SQCM/<: CPT | Performed by: SURGERY

## 2025-07-30 PROCEDURE — 11045 DBRDMT SUBQ TISS EACH ADDL: CPT

## 2025-07-30 PROCEDURE — 11045 DBRDMT SUBQ TISS EACH ADDL: CPT | Performed by: SURGERY

## 2025-07-30 PROCEDURE — 11042 DBRDMT SUBQ TIS 1ST 20SQCM/<: CPT

## 2025-07-30 RX ORDER — LIDOCAINE HYDROCHLORIDE 40 MG/ML
SOLUTION TOPICAL PRN
OUTPATIENT
Start: 2025-07-30

## 2025-07-30 RX ORDER — GENTAMICIN SULFATE 1 MG/G
OINTMENT TOPICAL PRN
OUTPATIENT
Start: 2025-07-30

## 2025-07-30 RX ORDER — LIDOCAINE 40 MG/G
CREAM TOPICAL PRN
OUTPATIENT
Start: 2025-07-30

## 2025-07-30 RX ORDER — LIDOCAINE HYDROCHLORIDE 40 MG/ML
SOLUTION TOPICAL PRN
Status: DISCONTINUED | OUTPATIENT
Start: 2025-07-30 | End: 2025-07-31 | Stop reason: HOSPADM

## 2025-07-30 RX ORDER — LIDOCAINE 50 MG/G
OINTMENT TOPICAL PRN
OUTPATIENT
Start: 2025-07-30

## 2025-07-30 RX ORDER — LIDOCAINE HYDROCHLORIDE 20 MG/ML
JELLY TOPICAL PRN
OUTPATIENT
Start: 2025-07-30

## 2025-07-30 RX ORDER — NEOMYCIN/BACITRACIN/POLYMYXINB 3.5-400-5K
OINTMENT (GRAM) TOPICAL PRN
OUTPATIENT
Start: 2025-07-30

## 2025-07-30 RX ORDER — SILVER SULFADIAZINE 10 MG/G
CREAM TOPICAL PRN
OUTPATIENT
Start: 2025-07-30

## 2025-07-30 RX ORDER — MUPIROCIN 2 %
OINTMENT (GRAM) TOPICAL PRN
OUTPATIENT
Start: 2025-07-30

## 2025-07-30 RX ORDER — BACITRACIN ZINC AND POLYMYXIN B SULFATE 500; 1000 [USP'U]/G; [USP'U]/G
OINTMENT TOPICAL PRN
OUTPATIENT
Start: 2025-07-30

## 2025-07-30 RX ORDER — BETAMETHASONE DIPROPIONATE 0.5 MG/G
CREAM TOPICAL PRN
OUTPATIENT
Start: 2025-07-30

## 2025-07-30 RX ORDER — TRIAMCINOLONE ACETONIDE 1 MG/G
OINTMENT TOPICAL PRN
OUTPATIENT
Start: 2025-07-30

## 2025-07-30 RX ORDER — CLOBETASOL PROPIONATE 0.5 MG/G
OINTMENT TOPICAL PRN
OUTPATIENT
Start: 2025-07-30

## 2025-07-30 RX ORDER — SODIUM CHLOR/HYPOCHLOROUS ACID 0.033 %
SOLUTION, IRRIGATION IRRIGATION PRN
OUTPATIENT
Start: 2025-07-30

## 2025-07-30 RX ORDER — BACITRACIN ZINC 500 [USP'U]/G
OINTMENT TOPICAL PRN
OUTPATIENT
Start: 2025-07-30

## 2025-07-30 NOTE — DISCHARGE INSTR - COC
Continuity of Care Form    Patient Name: Sil Rascon   :  1926  MRN:  [id number]      Wound Care Documentation and Therapy:  Wound 25 Leg Right;Lower;Posterior #1 (Active)   Wound Image   25 1008   Wound Etiology Venous 25 1008   Dressing Status New dressing applied 25 1155   Wound Cleansed Cleansed with saline 25 1155   Dressing/Treatment Roll gauze 25 1155   Wound Length (cm) 7 cm 25 1048   Wound Width (cm) 6 cm 25 1048   Wound Depth (cm) 0.2 cm 25 1048   Wound Surface Area (cm^2) 42 cm^2 25 1048   Change in Wound Size % (l*w) 3.23 25 1048   Wound Volume (cm^3) 8.4 cm^3 25 1048   Wound Healing % 61 25 1048   Post-Procedure Length (cm) 7 cm 25 1102   Post-Procedure Width (cm) 6 cm 25 1102   Post-Procedure Depth (cm) 0.3 cm 25 1102   Post-Procedure Surface Area (cm^2) 42 cm^2 25 1102   Post-Procedure Volume (cm^3) 12.6 cm^3 25 1102   Undermining Starts ___ O'Clock 6 25 1008   Undermining Ends___ O'Clock 1 25 1008   Undermining Maxium Distance (cm) 1.5@1 25 1008   Wound Assessment Granulation tissue 25 1048   Drainage Amount Small (< 25%) 25 1048   Drainage Description Serosanguinous 25 1048   Odor None 25 1048   Gina-wound Assessment Fragile;Blanchable erythema 25 1048   Margins Attached edges 25 1048   Wound Thickness Description not for Pressure Injury Full thickness 25 1008   Number of days: 42

## 2025-07-30 NOTE — PROGRESS NOTES
base.    Performed by: Scott Aguilar MD    Consent obtained:  Yes    Time out taken:  Yes    Pain Control: Anesthetic  Anesthetic: 4% Lidocaine Liquid Topical     Debridement:Excisional Debridement    Using curette the wound(s)/ulcer(s) was/were sharply debrided down through and including the removal of epidermis, dermis, and subcutaneous tissue.        Devitalized Tissue Debrided:  fibrin, biofilm, slough, and exudate to stimulate bleeding to promote healing, post debridement good bleeding base and wound edges noted    Wound/Ulcer #: 1    Percent of Wound/Ulcer Debrided: 80%    Total Surface Area Debrided:  30 sq cm     Estimated Blood Loss:  Minimal  Hemostasis Achieved:  by pressure    Procedural Pain:  6  / 10   Post Procedural Pain:  5 / 10     Response to treatment:  well tolerated    Plan:   Treatment Note please see attached Discharge Instructions    Written patient dismissal instructions given to patient and signed by patient or POA.         Discharge Instructions         Visit Discharge/Physician Orders     Discharge condition: Stable     Assessment of pain at discharge:Assessed     Anesthetic used: lidocaine 4%      Discharge to:ECF      Left via:Private automobile     Accompanied by: baldomero      ECF/[facility]HA: [facility]      Dressing Orders:Cleanse wound to Right posterior leg with normal saline, apply Drawtex (if unable to obtain drawtex may continue to use alginate ag- use double layer)  to wound bed and cover with ABD pad and wrap with kerlix and coban from base of toes to base of knee- change every daily.    Keep wrap dry at all times. If wrap becomes wet, becomes painful or falls 2 inches- may remove wrap and replace dressing      Spandgrip on Left leg- On in morning and off at night       Treatment Orders: 6/18 Elevate legs throughout the day   6/18 No pressure to the wound  6/18 Dietitian to review chart for increased protein for wound healing    FOLLOW NUTRITIOUS DIET. CHOOSE FOODS

## 2025-08-13 ENCOUNTER — HOSPITAL ENCOUNTER (OUTPATIENT)
Dept: WOUND CARE | Age: 89
Discharge: HOME | End: 2025-08-13
Payer: MEDICARE

## 2025-08-13 VITALS
TEMPERATURE: 98.3 F | WEIGHT: 174 LBS | HEIGHT: 62 IN | DIASTOLIC BLOOD PRESSURE: 68 MMHG | HEART RATE: 64 BPM | BODY MASS INDEX: 32.02 KG/M2 | RESPIRATION RATE: 20 BRPM | SYSTOLIC BLOOD PRESSURE: 112 MMHG

## 2025-08-13 DIAGNOSIS — L97.212 SKIN ULCER OF RIGHT CALF WITH FAT LAYER EXPOSED (HCC): Primary | ICD-10-CM

## 2025-08-13 PROCEDURE — 11042 DBRDMT SUBQ TIS 1ST 20SQCM/<: CPT

## 2025-08-13 PROCEDURE — 11045 DBRDMT SUBQ TISS EACH ADDL: CPT | Performed by: SURGERY

## 2025-08-13 PROCEDURE — 11042 DBRDMT SUBQ TIS 1ST 20SQCM/<: CPT | Performed by: SURGERY

## 2025-08-13 RX ORDER — BACITRACIN ZINC 500 [USP'U]/G
OINTMENT TOPICAL PRN
OUTPATIENT
Start: 2025-08-13

## 2025-08-13 RX ORDER — MUPIROCIN 2 %
OINTMENT (GRAM) TOPICAL PRN
OUTPATIENT
Start: 2025-08-13

## 2025-08-13 RX ORDER — LIDOCAINE HYDROCHLORIDE 20 MG/ML
JELLY TOPICAL PRN
OUTPATIENT
Start: 2025-08-13

## 2025-08-13 RX ORDER — LIDOCAINE 40 MG/G
CREAM TOPICAL PRN
OUTPATIENT
Start: 2025-08-13

## 2025-08-13 RX ORDER — SODIUM CHLOR/HYPOCHLOROUS ACID 0.033 %
SOLUTION, IRRIGATION IRRIGATION PRN
OUTPATIENT
Start: 2025-08-13

## 2025-08-13 RX ORDER — LIDOCAINE HYDROCHLORIDE 40 MG/ML
SOLUTION TOPICAL PRN
OUTPATIENT
Start: 2025-08-13

## 2025-08-13 RX ORDER — LIDOCAINE HYDROCHLORIDE 40 MG/ML
SOLUTION TOPICAL PRN
Status: DISCONTINUED | OUTPATIENT
Start: 2025-08-13 | End: 2025-08-14 | Stop reason: HOSPADM

## 2025-08-13 RX ORDER — LIDOCAINE 50 MG/G
OINTMENT TOPICAL PRN
OUTPATIENT
Start: 2025-08-13

## 2025-08-13 RX ORDER — TRIAMCINOLONE ACETONIDE 1 MG/G
OINTMENT TOPICAL PRN
OUTPATIENT
Start: 2025-08-13

## 2025-08-13 RX ORDER — GENTAMICIN SULFATE 1 MG/G
OINTMENT TOPICAL PRN
OUTPATIENT
Start: 2025-08-13

## 2025-08-13 RX ORDER — CLOBETASOL PROPIONATE 0.5 MG/G
OINTMENT TOPICAL PRN
OUTPATIENT
Start: 2025-08-13

## 2025-08-13 RX ORDER — BACITRACIN ZINC AND POLYMYXIN B SULFATE 500; 1000 [USP'U]/G; [USP'U]/G
OINTMENT TOPICAL PRN
OUTPATIENT
Start: 2025-08-13

## 2025-08-13 RX ORDER — BETAMETHASONE DIPROPIONATE 0.5 MG/G
CREAM TOPICAL PRN
OUTPATIENT
Start: 2025-08-13

## 2025-08-13 RX ORDER — NEOMYCIN/BACITRACIN/POLYMYXINB 3.5-400-5K
OINTMENT (GRAM) TOPICAL PRN
OUTPATIENT
Start: 2025-08-13

## 2025-08-13 RX ORDER — SILVER SULFADIAZINE 10 MG/G
CREAM TOPICAL PRN
OUTPATIENT
Start: 2025-08-13

## 2025-08-13 RX ORDER — ACETAMINOPHEN 325 MG/1
650 TABLET ORAL EVERY 6 HOURS PRN
COMMUNITY

## 2025-08-13 RX ADMIN — LIDOCAINE HYDROCHLORIDE 10 ML: 40 SOLUTION TOPICAL at 11:11

## 2025-08-13 ASSESSMENT — PAIN SCALES - GENERAL: PAINLEVEL_OUTOF10: 0

## 2025-08-27 ENCOUNTER — HOSPITAL ENCOUNTER (OUTPATIENT)
Dept: WOUND CARE | Age: 89
Discharge: HOME | End: 2025-08-27
Attending: SURGERY | Admitting: SURGERY
Payer: MEDICARE

## 2025-08-27 VITALS
BODY MASS INDEX: 32.02 KG/M2 | TEMPERATURE: 96 F | HEART RATE: 84 BPM | WEIGHT: 174 LBS | SYSTOLIC BLOOD PRESSURE: 116 MMHG | HEIGHT: 62 IN | RESPIRATION RATE: 18 BRPM | DIASTOLIC BLOOD PRESSURE: 75 MMHG

## 2025-08-27 DIAGNOSIS — L97.212 SKIN ULCER OF RIGHT CALF WITH FAT LAYER EXPOSED (HCC): Primary | ICD-10-CM

## 2025-08-27 PROCEDURE — 11042 DBRDMT SUBQ TIS 1ST 20SQCM/<: CPT

## 2025-08-27 PROCEDURE — 11042 DBRDMT SUBQ TIS 1ST 20SQCM/<: CPT | Performed by: SURGERY

## 2025-08-27 RX ORDER — LIDOCAINE HYDROCHLORIDE 40 MG/ML
SOLUTION TOPICAL PRN
Status: DISCONTINUED | OUTPATIENT
Start: 2025-08-27 | End: 2025-08-28 | Stop reason: HOSPADM

## 2025-08-27 RX ORDER — SODIUM CHLOR/HYPOCHLOROUS ACID 0.033 %
SOLUTION, IRRIGATION IRRIGATION PRN
OUTPATIENT
Start: 2025-08-27

## 2025-08-27 RX ORDER — GENTAMICIN SULFATE 1 MG/G
OINTMENT TOPICAL PRN
OUTPATIENT
Start: 2025-08-27

## 2025-08-27 RX ORDER — LIDOCAINE 50 MG/G
OINTMENT TOPICAL PRN
OUTPATIENT
Start: 2025-08-27

## 2025-08-27 RX ORDER — CLOBETASOL PROPIONATE 0.5 MG/G
OINTMENT TOPICAL PRN
OUTPATIENT
Start: 2025-08-27

## 2025-08-27 RX ORDER — NEOMYCIN/BACITRACIN/POLYMYXINB 3.5-400-5K
OINTMENT (GRAM) TOPICAL PRN
OUTPATIENT
Start: 2025-08-27

## 2025-08-27 RX ORDER — LIDOCAINE 40 MG/G
CREAM TOPICAL PRN
OUTPATIENT
Start: 2025-08-27

## 2025-08-27 RX ORDER — BACITRACIN ZINC AND POLYMYXIN B SULFATE 500; 1000 [USP'U]/G; [USP'U]/G
OINTMENT TOPICAL PRN
OUTPATIENT
Start: 2025-08-27

## 2025-08-27 RX ORDER — LIDOCAINE HYDROCHLORIDE 40 MG/ML
SOLUTION TOPICAL PRN
OUTPATIENT
Start: 2025-08-27

## 2025-08-27 RX ORDER — BACITRACIN ZINC 500 [USP'U]/G
OINTMENT TOPICAL PRN
OUTPATIENT
Start: 2025-08-27

## 2025-08-27 RX ORDER — LIDOCAINE HYDROCHLORIDE 20 MG/ML
JELLY TOPICAL PRN
OUTPATIENT
Start: 2025-08-27

## 2025-08-27 RX ORDER — SILVER SULFADIAZINE 10 MG/G
CREAM TOPICAL PRN
OUTPATIENT
Start: 2025-08-27

## 2025-08-27 RX ORDER — TRIAMCINOLONE ACETONIDE 1 MG/G
OINTMENT TOPICAL PRN
OUTPATIENT
Start: 2025-08-27

## 2025-08-27 RX ORDER — MUPIROCIN 2 %
OINTMENT (GRAM) TOPICAL PRN
OUTPATIENT
Start: 2025-08-27

## 2025-08-27 RX ORDER — BETAMETHASONE DIPROPIONATE 0.5 MG/G
CREAM TOPICAL PRN
OUTPATIENT
Start: 2025-08-27

## 2025-08-27 RX ADMIN — LIDOCAINE HYDROCHLORIDE 10 ML: 40 SOLUTION TOPICAL at 10:08

## 2025-08-27 ASSESSMENT — PAIN SCALES - GENERAL: PAINLEVEL_OUTOF10: 0

## (undated) DEVICE — ELECTRODE PT RET AD L9FT HI MOIST COND ADH HYDRGEL CORDED

## (undated) DEVICE — PACK,HEAVY DRAINAGE,STERILE: Brand: MEDLINE INDUSTRIES, INC.

## (undated) DEVICE — SYRINGE MED 5ML STD CLR PLAS LUERLOCK TIP N CTRL DISP

## (undated) DEVICE — DRESSING ANTIMIC ALG OPTICELL GELLING FBR 6X6IN

## (undated) DEVICE — TOWEL,OR,DSP,ST,BLUE,STD,6/PK,12PK/CS: Brand: MEDLINE

## (undated) DEVICE — 3M™ STERI-DRAPE™ ISOLATION BAG, 10 PER CARTON / 4 CARTONS PER CASE, 1003: Brand: 3M™ STERI-DRAPE™

## (undated) DEVICE — SYRINGE MED 10ML LUERLOCK TIP W/O SFTY DISP

## (undated) DEVICE — SYRINGE MED 20ML STD CLR PLAS LUERLOCK TIP N CTRL DISP

## (undated) DEVICE — STRAP POS MP 30X3 IN HK LOOP CLOSURE FOAM DISP

## (undated) DEVICE — MINOR VASCULAR: Brand: MEDLINE INDUSTRIES, INC.